# Patient Record
Sex: FEMALE | Race: WHITE | NOT HISPANIC OR LATINO | Employment: UNEMPLOYED | URBAN - NONMETROPOLITAN AREA
[De-identification: names, ages, dates, MRNs, and addresses within clinical notes are randomized per-mention and may not be internally consistent; named-entity substitution may affect disease eponyms.]

---

## 2021-04-30 ENCOUNTER — APPOINTMENT (EMERGENCY)
Dept: RADIOLOGY | Facility: HOSPITAL | Age: 56
DRG: 177 | End: 2021-04-30
Payer: MEDICARE

## 2021-04-30 ENCOUNTER — HOSPITAL ENCOUNTER (INPATIENT)
Facility: HOSPITAL | Age: 56
LOS: 3 days | Discharge: HOME | DRG: 177 | End: 2021-05-03
Attending: EMERGENCY MEDICINE | Admitting: STUDENT IN AN ORGANIZED HEALTH CARE EDUCATION/TRAINING PROGRAM
Payer: MEDICARE

## 2021-04-30 ENCOUNTER — APPOINTMENT (EMERGENCY)
Dept: CT IMAGING | Facility: HOSPITAL | Age: 56
DRG: 177 | End: 2021-04-30
Payer: MEDICARE

## 2021-04-30 DIAGNOSIS — J12.82 PNEUMONIA DUE TO COVID-19 VIRUS: Primary | ICD-10-CM

## 2021-04-30 DIAGNOSIS — E87.6 HYPOKALEMIA: ICD-10-CM

## 2021-04-30 DIAGNOSIS — R79.89 ELEVATED SERUM CREATININE: ICD-10-CM

## 2021-04-30 DIAGNOSIS — R09.02 HYPOXIA: ICD-10-CM

## 2021-04-30 DIAGNOSIS — E87.1 HYPONATREMIA: ICD-10-CM

## 2021-04-30 DIAGNOSIS — U07.1 PNEUMONIA DUE TO COVID-19 VIRUS: Primary | ICD-10-CM

## 2021-04-30 PROBLEM — I10 ESSENTIAL HYPERTENSION: Status: ACTIVE | Noted: 2021-04-30

## 2021-04-30 PROBLEM — J96.01 ACUTE RESPIRATORY FAILURE WITH HYPOXIA (HCC): Status: ACTIVE | Noted: 2021-04-30

## 2021-04-30 PROBLEM — K21.9 GASTROESOPHAGEAL REFLUX DISEASE WITHOUT ESOPHAGITIS: Status: ACTIVE | Noted: 2021-04-30

## 2021-04-30 PROBLEM — J45.909 UNCOMPLICATED ASTHMA: Status: ACTIVE | Noted: 2021-04-30

## 2021-04-30 PROBLEM — N17.9 ACUTE KIDNEY INJURY (HCC): Status: ACTIVE | Noted: 2021-04-30

## 2021-04-30 LAB
ALBUMIN SERPL BCP-MCNC: 3.4 G/DL (ref 3.5–5)
ALP SERPL-CCNC: 91 U/L (ref 46–116)
ALT SERPL W P-5'-P-CCNC: 41 U/L (ref 12–78)
ANION GAP SERPL CALCULATED.3IONS-SCNC: 10 MMOL/L (ref 4–13)
ANISOCYTOSIS BLD QL SMEAR: PRESENT
AST SERPL W P-5'-P-CCNC: 47 U/L (ref 5–45)
BASOPHILS # BLD MANUAL: 0 THOUSAND/UL (ref 0–0.1)
BASOPHILS NFR MAR MANUAL: 0 % (ref 0–1)
BILIRUB SERPL-MCNC: 0.65 MG/DL (ref 0.2–1)
BUN SERPL-MCNC: 41 MG/DL (ref 5–25)
CALCIUM ALBUM COR SERPL-MCNC: 8.9 MG/DL (ref 8.3–10.1)
CALCIUM SERPL-MCNC: 8.4 MG/DL (ref 8.3–10.1)
CHLORIDE SERPL-SCNC: 95 MMOL/L (ref 100–108)
CO2 SERPL-SCNC: 26 MMOL/L (ref 21–32)
CREAT SERPL-MCNC: 1.82 MG/DL (ref 0.6–1.3)
CRP SERPL QL: 54.1 MG/L
D DIMER PPP FEU-MCNC: 1.06 UG/ML FEU
EOSINOPHIL # BLD MANUAL: 0 THOUSAND/UL (ref 0–0.4)
EOSINOPHIL NFR BLD MANUAL: 0 % (ref 0–6)
ERYTHROCYTE [DISTWIDTH] IN BLOOD BY AUTOMATED COUNT: 14.6 % (ref 11.6–15.1)
GFR SERPL CREATININE-BSD FRML MDRD: 30 ML/MIN/1.73SQ M
GLUCOSE SERPL-MCNC: 115 MG/DL (ref 65–140)
HCT VFR BLD AUTO: 44.8 % (ref 34.8–46.1)
HGB BLD-MCNC: 14.4 G/DL (ref 11.5–15.4)
LACTATE SERPL-SCNC: 1.4 MMOL/L (ref 0.5–2)
LG PLATELETS BLD QL SMEAR: PRESENT
LYMPHOCYTES # BLD AUTO: 1.18 THOUSAND/UL (ref 0.6–4.47)
LYMPHOCYTES # BLD AUTO: 21 % (ref 14–44)
MCH RBC QN AUTO: 26.5 PG (ref 26.8–34.3)
MCHC RBC AUTO-ENTMCNC: 32.1 G/DL (ref 31.4–37.4)
MCV RBC AUTO: 82 FL (ref 82–98)
MONOCYTES # BLD AUTO: 0.17 THOUSAND/UL (ref 0–1.22)
MONOCYTES NFR BLD: 3 % (ref 4–12)
NEUTROPHILS # BLD MANUAL: 4.26 THOUSAND/UL (ref 1.85–7.62)
NEUTS SEG NFR BLD AUTO: 76 % (ref 43–75)
NRBC BLD AUTO-RTO: 0 /100 WBCS
NT-PROBNP SERPL-MCNC: 17 PG/ML
PLATELET # BLD AUTO: 173 THOUSANDS/UL (ref 149–390)
PLATELET BLD QL SMEAR: ABNORMAL
PMV BLD AUTO: 11.4 FL (ref 8.9–12.7)
POTASSIUM SERPL-SCNC: 3.2 MMOL/L (ref 3.5–5.3)
PROT SERPL-MCNC: 7.5 G/DL (ref 6.4–8.2)
RBC # BLD AUTO: 5.44 MILLION/UL (ref 3.81–5.12)
RBC MORPH BLD: PRESENT
SARS-COV-2 RNA RESP QL NAA+PROBE: POSITIVE
SODIUM SERPL-SCNC: 131 MMOL/L (ref 136–145)
TOTAL CELLS COUNTED SPEC: 100
TROPONIN I SERPL-MCNC: <0.02 NG/ML
WBC # BLD AUTO: 5.61 THOUSAND/UL (ref 4.31–10.16)

## 2021-04-30 PROCEDURE — 99285 EMERGENCY DEPT VISIT HI MDM: CPT

## 2021-04-30 PROCEDURE — U0005 INFEC AGEN DETEC AMPLI PROBE: HCPCS | Performed by: PHYSICIAN ASSISTANT

## 2021-04-30 PROCEDURE — 85027 COMPLETE CBC AUTOMATED: CPT | Performed by: PHYSICIAN ASSISTANT

## 2021-04-30 PROCEDURE — 94760 N-INVAS EAR/PLS OXIMETRY 1: CPT

## 2021-04-30 PROCEDURE — 80053 COMPREHEN METABOLIC PANEL: CPT | Performed by: PHYSICIAN ASSISTANT

## 2021-04-30 PROCEDURE — 86140 C-REACTIVE PROTEIN: CPT | Performed by: PHYSICIAN ASSISTANT

## 2021-04-30 PROCEDURE — 85379 FIBRIN DEGRADATION QUANT: CPT | Performed by: PHYSICIAN ASSISTANT

## 2021-04-30 PROCEDURE — 87040 BLOOD CULTURE FOR BACTERIA: CPT | Performed by: PHYSICIAN ASSISTANT

## 2021-04-30 PROCEDURE — XW033E5 INTRODUCTION OF REMDESIVIR ANTI-INFECTIVE INTO PERIPHERAL VEIN, PERCUTANEOUS APPROACH, NEW TECHNOLOGY GROUP 5: ICD-10-PCS | Performed by: STUDENT IN AN ORGANIZED HEALTH CARE EDUCATION/TRAINING PROGRAM

## 2021-04-30 PROCEDURE — 71045 X-RAY EXAM CHEST 1 VIEW: CPT

## 2021-04-30 PROCEDURE — 71275 CT ANGIOGRAPHY CHEST: CPT

## 2021-04-30 PROCEDURE — 93005 ELECTROCARDIOGRAM TRACING: CPT

## 2021-04-30 PROCEDURE — 99285 EMERGENCY DEPT VISIT HI MDM: CPT | Performed by: EMERGENCY MEDICINE

## 2021-04-30 PROCEDURE — 96375 TX/PRO/DX INJ NEW DRUG ADDON: CPT

## 2021-04-30 PROCEDURE — 94664 DEMO&/EVAL PT USE INHALER: CPT

## 2021-04-30 PROCEDURE — 85025 COMPLETE CBC W/AUTO DIFF WBC: CPT | Performed by: PHYSICIAN ASSISTANT

## 2021-04-30 PROCEDURE — 83880 ASSAY OF NATRIURETIC PEPTIDE: CPT | Performed by: PHYSICIAN ASSISTANT

## 2021-04-30 PROCEDURE — 83605 ASSAY OF LACTIC ACID: CPT | Performed by: PHYSICIAN ASSISTANT

## 2021-04-30 PROCEDURE — 85007 BL SMEAR W/DIFF WBC COUNT: CPT | Performed by: PHYSICIAN ASSISTANT

## 2021-04-30 PROCEDURE — 96365 THER/PROPH/DIAG IV INF INIT: CPT

## 2021-04-30 PROCEDURE — 99223 1ST HOSP IP/OBS HIGH 75: CPT | Performed by: STUDENT IN AN ORGANIZED HEALTH CARE EDUCATION/TRAINING PROGRAM

## 2021-04-30 PROCEDURE — 36415 COLL VENOUS BLD VENIPUNCTURE: CPT | Performed by: PHYSICIAN ASSISTANT

## 2021-04-30 PROCEDURE — 84484 ASSAY OF TROPONIN QUANT: CPT | Performed by: PHYSICIAN ASSISTANT

## 2021-04-30 PROCEDURE — U0003 INFECTIOUS AGENT DETECTION BY NUCLEIC ACID (DNA OR RNA); SEVERE ACUTE RESPIRATORY SYNDROME CORONAVIRUS 2 (SARS-COV-2) (CORONAVIRUS DISEASE [COVID-19]), AMPLIFIED PROBE TECHNIQUE, MAKING USE OF HIGH THROUGHPUT TECHNOLOGIES AS DESCRIBED BY CMS-2020-01-R: HCPCS | Performed by: PHYSICIAN ASSISTANT

## 2021-04-30 RX ORDER — DEXAMETHASONE SODIUM PHOSPHATE 10 MG/ML
10 INJECTION, SOLUTION INTRAMUSCULAR; INTRAVENOUS ONCE
Status: COMPLETED | OUTPATIENT
Start: 2021-04-30 | End: 2021-04-30

## 2021-04-30 RX ORDER — SODIUM CHLORIDE 9 MG/ML
75 INJECTION, SOLUTION INTRAVENOUS CONTINUOUS
Status: DISCONTINUED | OUTPATIENT
Start: 2021-04-30 | End: 2021-05-03 | Stop reason: HOSPADM

## 2021-04-30 RX ORDER — ASPIRIN 81 MG/1
81 TABLET, CHEWABLE ORAL
COMMUNITY

## 2021-04-30 RX ORDER — BUDESONIDE AND FORMOTEROL FUMARATE DIHYDRATE 160; 4.5 UG/1; UG/1
2 AEROSOL RESPIRATORY (INHALATION) 2 TIMES DAILY
COMMUNITY

## 2021-04-30 RX ORDER — FAMOTIDINE 20 MG/1
20 TABLET, FILM COATED ORAL DAILY
Status: DISCONTINUED | OUTPATIENT
Start: 2021-04-30 | End: 2021-05-02

## 2021-04-30 RX ORDER — NEBIVOLOL 10 MG/1
10 TABLET ORAL
COMMUNITY

## 2021-04-30 RX ORDER — BENZONATATE 100 MG/1
100 CAPSULE ORAL 3 TIMES DAILY PRN
Status: DISCONTINUED | OUTPATIENT
Start: 2021-04-30 | End: 2021-05-01

## 2021-04-30 RX ORDER — OMEPRAZOLE 40 MG/1
40 CAPSULE, DELAYED RELEASE ORAL DAILY
COMMUNITY

## 2021-04-30 RX ORDER — HEPARIN SODIUM 5000 [USP'U]/ML
5000 INJECTION, SOLUTION INTRAVENOUS; SUBCUTANEOUS EVERY 8 HOURS SCHEDULED
Status: DISCONTINUED | OUTPATIENT
Start: 2021-04-30 | End: 2021-05-03 | Stop reason: HOSPADM

## 2021-04-30 RX ORDER — MELATONIN
2000 DAILY
Status: DISCONTINUED | OUTPATIENT
Start: 2021-04-30 | End: 2021-05-03 | Stop reason: HOSPADM

## 2021-04-30 RX ORDER — MULTIVITAMIN/IRON/FOLIC ACID 18MG-0.4MG
1 TABLET ORAL DAILY
Status: DISCONTINUED | OUTPATIENT
Start: 2021-05-07 | End: 2021-05-03 | Stop reason: HOSPADM

## 2021-04-30 RX ORDER — RIZATRIPTAN BENZOATE 10 MG/1
10 TABLET ORAL ONCE AS NEEDED
COMMUNITY

## 2021-04-30 RX ORDER — CEFTRIAXONE 1 G/50ML
1000 INJECTION, SOLUTION INTRAVENOUS EVERY 24 HOURS
Status: DISCONTINUED | OUTPATIENT
Start: 2021-04-30 | End: 2021-05-03 | Stop reason: HOSPADM

## 2021-04-30 RX ORDER — DOXYCYCLINE HYCLATE 100 MG/1
100 CAPSULE ORAL EVERY 12 HOURS
Status: DISCONTINUED | OUTPATIENT
Start: 2021-04-30 | End: 2021-05-03 | Stop reason: HOSPADM

## 2021-04-30 RX ORDER — ASPIRIN 81 MG/1
81 TABLET, CHEWABLE ORAL
Status: DISCONTINUED | OUTPATIENT
Start: 2021-04-30 | End: 2021-05-03 | Stop reason: HOSPADM

## 2021-04-30 RX ORDER — DEXAMETHASONE SODIUM PHOSPHATE 4 MG/ML
6 INJECTION, SOLUTION INTRA-ARTICULAR; INTRALESIONAL; INTRAMUSCULAR; INTRAVENOUS; SOFT TISSUE EVERY 24 HOURS
Status: DISCONTINUED | OUTPATIENT
Start: 2021-04-30 | End: 2021-05-03 | Stop reason: HOSPADM

## 2021-04-30 RX ORDER — ZINC SULFATE 50(220)MG
220 CAPSULE ORAL DAILY
Status: DISCONTINUED | OUTPATIENT
Start: 2021-04-30 | End: 2021-05-03 | Stop reason: HOSPADM

## 2021-04-30 RX ORDER — ALBUTEROL SULFATE 90 UG/1
2 AEROSOL, METERED RESPIRATORY (INHALATION) ONCE
Status: COMPLETED | OUTPATIENT
Start: 2021-04-30 | End: 2021-04-30

## 2021-04-30 RX ORDER — ALBUTEROL SULFATE 90 UG/1
2 AEROSOL, METERED RESPIRATORY (INHALATION) EVERY 6 HOURS PRN
Status: DISCONTINUED | OUTPATIENT
Start: 2021-04-30 | End: 2021-05-03 | Stop reason: HOSPADM

## 2021-04-30 RX ORDER — BUDESONIDE AND FORMOTEROL FUMARATE DIHYDRATE 160; 4.5 UG/1; UG/1
2 AEROSOL RESPIRATORY (INHALATION) 2 TIMES DAILY
Status: DISCONTINUED | OUTPATIENT
Start: 2021-04-30 | End: 2021-05-03 | Stop reason: HOSPADM

## 2021-04-30 RX ORDER — LISINOPRIL 20 MG/1
20 TABLET ORAL
COMMUNITY

## 2021-04-30 RX ORDER — ASCORBIC ACID 500 MG
1000 TABLET ORAL EVERY 12 HOURS SCHEDULED
Status: DISCONTINUED | OUTPATIENT
Start: 2021-04-30 | End: 2021-05-03 | Stop reason: HOSPADM

## 2021-04-30 RX ORDER — CYCLOBENZAPRINE HCL 10 MG
10 TABLET ORAL
COMMUNITY

## 2021-04-30 RX ORDER — POTASSIUM CHLORIDE 20 MEQ/1
40 TABLET, EXTENDED RELEASE ORAL ONCE
Status: COMPLETED | OUTPATIENT
Start: 2021-04-30 | End: 2021-04-30

## 2021-04-30 RX ORDER — SIMVASTATIN 10 MG
10 TABLET ORAL
COMMUNITY

## 2021-04-30 RX ORDER — ALBUTEROL SULFATE 90 UG/1
2 AEROSOL, METERED RESPIRATORY (INHALATION) EVERY 6 HOURS PRN
COMMUNITY

## 2021-04-30 RX ORDER — GUAIFENESIN 600 MG
600 TABLET, EXTENDED RELEASE 12 HR ORAL EVERY 12 HOURS SCHEDULED
Status: DISCONTINUED | OUTPATIENT
Start: 2021-04-30 | End: 2021-05-03 | Stop reason: HOSPADM

## 2021-04-30 RX ORDER — ONDANSETRON 2 MG/ML
4 INJECTION INTRAMUSCULAR; INTRAVENOUS ONCE
Status: COMPLETED | OUTPATIENT
Start: 2021-04-30 | End: 2021-04-30

## 2021-04-30 RX ADMIN — GUAIFENESIN 600 MG: 600 TABLET, EXTENDED RELEASE ORAL at 21:11

## 2021-04-30 RX ADMIN — ONDANSETRON 4 MG: 2 INJECTION INTRAMUSCULAR; INTRAVENOUS at 11:32

## 2021-04-30 RX ADMIN — Medication 2000 UNITS: at 16:05

## 2021-04-30 RX ADMIN — BUDESONIDE AND FORMOTEROL FUMARATE DIHYDRATE 2 PUFF: 160; 4.5 AEROSOL RESPIRATORY (INHALATION) at 17:22

## 2021-04-30 RX ADMIN — ASPIRIN 81 MG CHEWABLE TABLET 81 MG: 81 TABLET CHEWABLE at 21:11

## 2021-04-30 RX ADMIN — HEPARIN SODIUM 5000 UNITS: 5000 INJECTION INTRAVENOUS; SUBCUTANEOUS at 17:20

## 2021-04-30 RX ADMIN — POTASSIUM CHLORIDE 40 MEQ: 1500 TABLET, EXTENDED RELEASE ORAL at 17:23

## 2021-04-30 RX ADMIN — DOXYCYCLINE 100 MG: 100 CAPSULE ORAL at 16:05

## 2021-04-30 RX ADMIN — SODIUM CHLORIDE 100 ML/HR: 0.9 INJECTION, SOLUTION INTRAVENOUS at 16:04

## 2021-04-30 RX ADMIN — HEPARIN SODIUM 5000 UNITS: 5000 INJECTION INTRAVENOUS; SUBCUTANEOUS at 21:11

## 2021-04-30 RX ADMIN — ALBUTEROL SULFATE 2 PUFF: 90 AEROSOL, METERED RESPIRATORY (INHALATION) at 12:47

## 2021-04-30 RX ADMIN — FAMOTIDINE 20 MG: 20 TABLET ORAL at 16:05

## 2021-04-30 RX ADMIN — DEXAMETHASONE SODIUM PHOSPHATE 6 MG: 4 INJECTION INTRA-ARTICULAR; INTRALESIONAL; INTRAMUSCULAR; INTRAVENOUS; SOFT TISSUE at 16:05

## 2021-04-30 RX ADMIN — BENZONATATE 100 MG: 100 CAPSULE ORAL at 16:05

## 2021-04-30 RX ADMIN — REMDESIVIR 200 MG: 100 INJECTION, POWDER, LYOPHILIZED, FOR SOLUTION INTRAVENOUS at 17:54

## 2021-04-30 RX ADMIN — CEFTRIAXONE 1000 MG: 1 INJECTION, SOLUTION INTRAVENOUS at 16:04

## 2021-04-30 RX ADMIN — SODIUM CHLORIDE, SODIUM LACTATE, POTASSIUM CHLORIDE, AND CALCIUM CHLORIDE 1000 ML: .6; .31; .03; .02 INJECTION, SOLUTION INTRAVENOUS at 11:32

## 2021-04-30 RX ADMIN — IOHEXOL 85 ML: 350 INJECTION, SOLUTION INTRAVENOUS at 12:33

## 2021-04-30 RX ADMIN — OXYCODONE HYDROCHLORIDE AND ACETAMINOPHEN 1000 MG: 500 TABLET ORAL at 21:11

## 2021-04-30 RX ADMIN — ZINC SULFATE 220 MG (50 MG) CAPSULE 220 MG: CAPSULE at 16:05

## 2021-04-30 RX ADMIN — SODIUM CHLORIDE 1000 ML: 0.9 INJECTION, SOLUTION INTRAVENOUS at 13:47

## 2021-04-30 RX ADMIN — DEXAMETHASONE SODIUM PHOSPHATE 10 MG: 10 INJECTION, SOLUTION INTRAMUSCULAR; INTRAVENOUS at 11:32

## 2021-04-30 NOTE — ASSESSMENT & PLAN NOTE
Patient on hydrochlorothiazide and lisinopril at home however will hold these in light of hypotension

## 2021-04-30 NOTE — ASSESSMENT & PLAN NOTE
Possibly secondary to dehydration and hypotension  No known CKD at baseline  Will gently hydrate with IV fluids  Continue to monitor creatinine  Avoid nephrotoxins  Monitor I's and O's  Avoid hypotensive episodes

## 2021-04-30 NOTE — RESPIRATORY THERAPY NOTE
RT Protocol Note  Melany River 60 y o  female MRN: [id number]  Unit/Bed#: -01 Encounter: [id number]    Assessment    Principal Problem:    Pneumonia due to COVID-19 virus  Active Problems:    Acute respiratory failure with hypoxia (HCC)    Uncomplicated asthma    Gastroesophageal reflux disease without esophagitis    Essential hypertension    Acute kidney injury ([place] [address] )      Home Pulmonary Medications:         Past Medical History:   Diagnosis Date    Asthma     CHF (congestive heart failure) ([place] [address] )     Diabetes mellitus ([place] [address] )     Fibromyalgia     Migraine      Social History     Socioeconomic History    Marital status: /Civil Union     Spouse name: None    Number of children: None    Years of education: None    Highest education level: None   Occupational History    None   Social Needs    Financial resource strain: None    Food insecurity     Worry: None     Inability: None    Transportation needs     Medical: None     Non-medical: None   Tobacco Use    Smoking status: Never Smoker    Smokeless tobacco: Never Used   Substance and Sexual Activity    Alcohol use: Yes     Frequency: Monthly or less     Drinks per session: 1 or 2     Binge frequency: Never    Drug use: Never    Sexual activity: None   Lifestyle    Physical activity     Days per week: None     Minutes per session: None    Stress: None   Relationships    Social connections     Talks on phone: None     Gets together: None     Attends [employer] service: None     Active member of club or organization: None     Attends meetings of clubs or organizations: None     Relationship status: None    Intimate partner violence     Fear of current or ex partner: None     Emotionally abused: None     Physically abused: None     Forced sexual activity: None   Other Topics Concern    None   Social History Narrative    None       Subjective         Objective    Physical Exam:   Assessment Type: (P) Assess only  General Appearance: (P) Alert, Awake  Respiratory Pattern: (P) Labored  Chest Assessment: (P) Chest expansion symmetrical  Bilateral Breath Sounds: (P) Diminished, Coarse  O2 Device: (P) 2L    Vitals:  Blood pressure 124/72, pulse 78, temperature 99 °F (37 2 °C), temperature source Oral, resp  rate 18, height 5' 6" (1 676 m), weight 116 kg (256 lb 9 9 oz), SpO2 100 %  Imaging and other studies: I have personally reviewed pertinent reports  O2 Device: (P) 2L     Plan    Respiratory Plan: (P) Home Bronchodilator Patient pathway        Resp Comments: (P) Pt admitted w/pneumonia due to covid + diagnosis  Pt is on 2L nasal canal with diminished and coarse BS bilaterally  Xray shows ground glass opacities  Pt w  HO asthma and take symbicort bid and albuterol prn  IS being performed by nursing

## 2021-04-30 NOTE — H&P
[address]  H&P- Blanquita Whittington 1965, 60 y o  female MRN: [id number]  Unit/Bed#: -01 Encounter: [id number]  Primary Care Provider: Alverto Marrero DO   Date and time admitted to hospital: 4/30/2021 11:05 AM    * Pneumonia due to COVID-19 virus  Assessment & Plan  Patient presenting with 2 and half weeks of fatigue and some cough  Worsening shortness of breath prompted her to come to the ED  Patient currently needing 2 L of oxygen via nasal cannula  CT scan showing bilateral ground-glass opacities highly suspicious for COVID pneumonia, COVID positive  Will admit patient under mild pathway  Follow-up inflammatory markers  Follow-up procalcitonin, if negative x2 will discontinue antibiotics  Respiratory protocol  Vitamin-C, zinc, vitamin-D, remdesivir (4/30-_)  Dexamethasone  Oxygen via nasal cannula currently on 2 L  CTA negative for PE  Not a candidate for convalescent plasma as symptoms have been ongoing for more than 5 days    Acute kidney injury ([address] )  Assessment & Plan  Possibly secondary to dehydration and hypotension  No known CKD at baseline  Will gently hydrate with IV fluids  Continue to monitor creatinine  Avoid nephrotoxins  Monitor I's and O's  Avoid hypotensive episodes    Essential hypertension  Assessment & Plan  Patient on hydrochlorothiazide and lisinopril at home however will hold these in light of hypotension    Gastroesophageal reflux disease without esophagitis  Assessment & Plan  Will start patient on famotidine at this time as part of mild COVID pathway  On omeprazole at home    Uncomplicated asthma  Assessment & Plan  Currently stable, no wheezing at this time, continue Symbicort and albuterol    Acute respiratory failure with hypoxia ([address] )  Assessment & Plan  Please see plan under COVID pneumonia      VTE Prophylaxis: Heparin  / sequential compression device   Code Status: FULL CODE  POLST: There is no POLST form on file for this patient (pre-hospital)  Discussion with family:  Patient    Anticipated Length of Stay:  Patient will be admitted on an Inpatient basis with an anticipated length of stay of  more than 2 midnights  Justification for Hospital Stay:  Acute hypoxic respiratory failure    Total Time for Visit, including Counseling / Coordination of Care: 45 minutes  Greater than 50% of this total time spent on direct patient counseling and coordination of care  Chief Complaint:   Worsening shortness of breath    History of Present Illness:    Linnette Parmar is a 60 y o  female who presents with worsening shortness of breath that started over the last 3 days  Patient states that she started having weakness, mild cough, fatigue 2 and half weeks ago  She and her  started exhibiting symptoms at about the same time after having been exposed to her sister and her sister's  who had mild cough and colds 3 weeks ago  She was feeling fine up to few days ago when she started feeling shortness of breath and cough  She endorses associated decreased appetite, some diarrhea, no abdominal pain  Increasing shortness of breath prompted her to seek consult in the emergency room  Review of Systems:    Review of Systems   Constitutional: Positive for activity change, appetite change, fatigue and fever  Negative for chills  HENT: Negative for ear pain and sore throat  Eyes: Negative for pain and visual disturbance  Respiratory: Positive for cough and shortness of breath  Cardiovascular: Negative for chest pain and palpitations  Gastrointestinal: Negative for abdominal pain and vomiting  Genitourinary: Negative for dysuria and hematuria  Musculoskeletal: Negative for arthralgias and back pain  Skin: Negative for color change and rash  Neurological: Negative for seizures and syncope  All other systems reviewed and are negative           Past Medical and Surgical History:     Past Medical History:   Diagnosis Date    Asthma  [facility]) [facility])     Diabetes mellitus ([facility] [address] )     Fibromyalgia     Migraine        Past Surgical History:   Procedure Laterality Date    BACK SURGERY      HYSTERECTOMY      NECK SURGERY         Meds/Allergies:    Prior to Admission medications    Medication Sig Start Date End Date Taking? Authorizing Provider   albuterol (PROVENTIL HFA,VENTOLIN HFA) 90 mcg/act inhaler Inhale 2 puffs every 6 (six) hours as needed for wheezing   Yes Historical Provider, MD   aspirin 81 mg chewable tablet Chew 81 mg daily at bedtime    Yes Historical Provider, MD   budesonide-formoterol (SYMBICORT) 160-4 5 mcg/act inhaler Inhale 2 puffs 2 (two) times a day Rinse mouth after use  Yes Historical Provider, MD   cyclobenzaprine (FLEXERIL) 10 mg tablet Take 10 mg by mouth daily at bedtime    Yes Historical Provider, MD   HYDROCHLOROTHIAZIDE PO Take 37 5 mg by mouth 2 (two) times a day    Yes Historical Provider, MD   lisinopril (ZESTRIL) 20 mg tablet Take 20 mg by mouth daily at bedtime    Yes Historical Provider, MD   nebivolol (BYSTOLIC) 10 mg tablet Take 10 mg by mouth daily at bedtime    Yes Historical Provider, MD   omeprazole (PriLOSEC) 40 MG capsule Take 40 mg by mouth daily   Yes Historical Provider, MD   rizatriptan (MAXALT) 10 MG tablet Take 10 mg by mouth once as needed for migraine May repeat in 2 hours if needed   Yes Historical Provider, MD   simvastatin (ZOCOR) 10 mg tablet Take 10 mg by mouth daily at bedtime   Yes Historical Provider, MD     I have reviewed home medications with patient personally  Allergies:    Allergies   Allergen Reactions    Hctz [Hydrochlorothiazide] Other (See Comments)     Only with [employer] brand causes fluid build up        Social History:     Marital Status: /Civil Union   Patient Pre-hospital Living Situation:  Lives at home with her   Patient Pre-hospital Level of Mobility:  ambulates independently  Patient Pre-hospital Diet Restrictions: None  Substance Use History:   Social History     Substance and Sexual Activity   Alcohol Use Yes    Frequency: Monthly or less    Drinks per session: 1 or 2    Binge frequency: Never     Social History     Tobacco Use   Smoking Status Never Smoker   Smokeless Tobacco Never Used     Social History     Substance and Sexual Activity   Drug Use Never       Family History:    Heart disease - father    Physical Exam:     Vitals:   Blood Pressure: 124/72 (04/30/21 1521)  Pulse: 78 (04/30/21 1521)  Temperature: 99 °F (37 2 °C) (04/30/21 1521)  Temp Source: Oral (04/30/21 1521)  Respirations: 18 (04/30/21 1521)  Height: 5' 6" (167 6 cm) (04/30/21 1543)  Weight - Scale: 116 kg (256 lb 9 9 oz) (04/30/21 1543)  SpO2: 100 % (04/30/21 1521)    Physical Exam  Vitals signs and nursing note reviewed  Constitutional:       General: She is not in acute distress  Appearance: She is well-developed  She is obese  She is ill-appearing  HENT:      Head: Normocephalic and atraumatic  Eyes:      Conjunctiva/sclera: Conjunctivae normal    Neck:      Musculoskeletal: Neck supple  Cardiovascular:      Rate and Rhythm: Normal rate and regular rhythm  Heart sounds: No murmur  Pulmonary:      Effort: Pulmonary effort is normal  No respiratory distress  Comments: Coarse breath sounds bilaterally  Abdominal:      Palpations: Abdomen is soft  Tenderness: There is no abdominal tenderness  Skin:     General: Skin is warm and dry  Neurological:      Mental Status: She is alert and oriented to person, place, and time  Additional Data:     Lab Results: I have personally reviewed pertinent reports        Results from last 7 days   Lab Units 04/30/21  1125   WBC Thousand/uL 5 61   HEMOGLOBIN g/dL 14 4   HEMATOCRIT % 44 8   PLATELETS Thousands/uL 173   LYMPHO PCT % 21   MONO PCT % 3*   EOS PCT % 0     Results from last 7 days   Lab Units 04/30/21  1125   SODIUM mmol/L 131*   POTASSIUM mmol/L 3 2*   CHLORIDE mmol/L 95*   CO2 mmol/L 26   BUN mg/dL 41*   CREATININE mg/dL 1 82*   ANION GAP mmol/L 10   CALCIUM mg/dL 8 4   ALBUMIN g/dL 3 4*   TOTAL BILIRUBIN mg/dL 0 65   ALK PHOS U/L 91   ALT U/L 41   AST U/L 47*   GLUCOSE RANDOM mg/dL 115                 Results from last 7 days   Lab Units 04/30/21  1125   LACTIC ACID mmol/L 1 4       Imaging: I have personally reviewed pertinent reports  CTA ED chest PE Study   Final Result by Vu Garcia MD (04/30 1331)      1  No evidence of pulmonary embolism   2  Multifocal bilateral groundglass opacities in the lungs  In the setting of COVID-19 infection, findings compatible with viral pneumonia   3  Mild mediastinal and hilar adenopathy, likely reactive   4  Bilateral nephrolithiasis                     Workstation performed: [id number]         XR chest 1 view portable   Final Result by Thomas Cornelius MD (04/30 8253)      Patchy opacity left lung base suspicious for possible Covid related infiltrate                  Workstation performed: [id number]           ·     Allscripts / Epic Records Reviewed: Yes     ** Please Note: This note has been constructed using a voice recognition system   **

## 2021-04-30 NOTE — ASSESSMENT & PLAN NOTE
Patient presenting with 2 and half weeks of fatigue and some cough  Worsening shortness of breath prompted her to come to the ED  Patient currently needing 2 L of oxygen via nasal cannula  CT scan showing bilateral ground-glass opacities highly suspicious for COVID pneumonia, COVID positive  Will admit patient under mild pathway  Follow-up inflammatory markers  Follow-up procalcitonin, if negative x2 will discontinue antibiotics  Respiratory protocol  Vitamin-C, zinc, vitamin-D, remdesivir (4/30-_)  Dexamethasone  Oxygen via nasal cannula currently on 2 L  CTA negative for PE  Not a candidate for convalescent plasma as symptoms have been ongoing for more than 5 days

## 2021-04-30 NOTE — ED PROVIDER NOTES
History  Chief Complaint   Patient presents with    Cough     pt has had cough for 2 5 weeks now  C/O fatigue and cough  Spouse tested + Covid yesterday  49-year-old female presents emergency department for evaluation of cough, shortness of breath  Past medical history asthma, CHF, diabetes, fibromyalgia  She reports she has had cough for the past 2 5 weeks  Cough is nonproductive  States she has been using Symbicort at without improvement of symptoms  Reports symptoms have been worsening  Patient states over last several days she had increased fatigue, generalized body aches  She reports  was admitted to the hospital yesterday for [place]  Patient reports chest pain only with coughing  Reports chest pain is generalized throughout the chest without radiation  She reports shortness of breath is worse with moving, talking, coughing  Patient denies any fevers or chills  She reports decreased appetite  Reports nausea, does not denies vomiting, diarrhea, constipation  Patient denies history of DVT or PE  She denies any hemoptysis  History provided by:  Patient  Shortness of Breath  Severity:  Moderate  Onset quality:  Gradual  Progression:  Worsening  Chronicity:  New  Relieved by:  Nothing  Ineffective treatments: Symbicort  Associated symptoms: chest pain, cough and headaches    Associated symptoms: no abdominal pain, no claudication, no diaphoresis, no ear pain, no fever, no hemoptysis, no neck pain, no PND, no rash, no sore throat, no sputum production, no syncope, no swollen glands, no vomiting and no wheezing        Prior to Admission Medications   Prescriptions Last Dose Informant Patient Reported? Taking?    HYDROCHLOROTHIAZIDE PO 4/29/2021 at Unknown time  Yes Yes   Sig: Take 37 5 mg by mouth 2 (two) times a day    albuterol (PROVENTIL HFA,VENTOLIN HFA) 90 mcg/act inhaler   Yes Yes   Sig: Inhale 2 puffs every 6 (six) hours as needed for wheezing   aspirin 81 mg chewable tablet 4/29/2021 at Unknown time  Yes Yes   Sig: Chew 81 mg daily at bedtime    budesonide-formoterol (SYMBICORT) 160-4 5 mcg/act inhaler 4/30/2021 at Unknown time  Yes Yes   Sig: Inhale 2 puffs 2 (two) times a day Rinse mouth after use  cyclobenzaprine (FLEXERIL) 10 mg tablet 4/29/2021 at Unknown time  Yes Yes   Sig: Take 10 mg by mouth daily at bedtime    lisinopril (ZESTRIL) 20 mg tablet 4/29/2021 at Unknown time  Yes Yes   Sig: Take 20 mg by mouth daily at bedtime    nebivolol (BYSTOLIC) 10 mg tablet 2/78/3149 at Unknown time  Yes Yes   Sig: Take 10 mg by mouth daily at bedtime    omeprazole (PriLOSEC) 40 MG capsule 4/30/2021 at Unknown time  Yes Yes   Sig: Take 40 mg by mouth daily   rizatriptan (MAXALT) 10 MG tablet Past Month at Unknown time  Yes Yes   Sig: Take 10 mg by mouth once as needed for migraine May repeat in 2 hours if needed   simvastatin (ZOCOR) 10 mg tablet 4/29/2021 at Unknown time  Yes Yes   Sig: Take 10 mg by mouth daily at bedtime      Facility-Administered Medications: None       Past Medical History:   Diagnosis Date    Asthma     [facility]     Diabetes mellitus ([facility] )     Fibromyalgia     Migraine        Past Surgical History:   Procedure Laterality Date    BACK SURGERY      HYSTERECTOMY      NECK SURGERY         History reviewed  No pertinent family history  I have reviewed and agree with the history as documented  E-Cigarette/Vaping    E-Cigarette Use Never User      E-Cigarette/Vaping Substances     Social History     Tobacco Use    Smoking status: Never Smoker    Smokeless tobacco: Never Used   Substance Use Topics    Alcohol use: Yes     Frequency: Monthly or less     Drinks per session: 1 or 2     Binge frequency: Never    Drug use: Never       Review of Systems   Constitutional: Positive for appetite change and fatigue  Negative for chills, diaphoresis and fever  HENT: Negative  Negative for ear pain and sore throat      Eyes: Negative for visual disturbance  Respiratory: Positive for cough and shortness of breath  Negative for hemoptysis, sputum production, choking, chest tightness, wheezing and stridor  Cardiovascular: Positive for chest pain  Negative for palpitations, claudication, leg swelling, syncope and PND  Gastrointestinal: Positive for nausea  Negative for abdominal distention, abdominal pain, constipation, diarrhea and vomiting  Genitourinary: Negative  Musculoskeletal: Positive for myalgias  Negative for neck pain  Skin: Negative for color change, pallor, rash and wound  Neurological: Positive for headaches  Negative for tremors, seizures, syncope, facial asymmetry, speech difficulty, weakness, light-headedness and numbness  All other systems reviewed and are negative  Physical Exam  Physical Exam  Vitals signs and nursing note reviewed  Constitutional:       General: She is not in acute distress  Appearance: Normal appearance  She is obese  She is not ill-appearing, toxic-appearing or diaphoretic  HENT:      Head: Normocephalic and atraumatic  Nose: Nose normal  No congestion or rhinorrhea  Mouth/Throat:      Mouth: Mucous membranes are moist       Pharynx: Oropharynx is clear  No oropharyngeal exudate or posterior oropharyngeal erythema  Eyes:      Extraocular Movements: Extraocular movements intact  Conjunctiva/sclera: Conjunctivae normal       Pupils: Pupils are equal, round, and reactive to light  Neck:      Musculoskeletal: Normal range of motion and neck supple  No muscular tenderness  Cardiovascular:      Rate and Rhythm: Normal rate and regular rhythm  Pulses:           Radial pulses are 2+ on the right side and 2+ on the left side  Dorsalis pedis pulses are 2+ on the right side and 2+ on the left side  Posterior tibial pulses are 2+ on the right side and 2+ on the left side  Pulmonary:      Effort: Tachypnea present        Breath sounds: Decreased breath sounds, wheezing and rhonchi present  No rales  Abdominal:      General: Abdomen is flat  Bowel sounds are normal  There is no distension  Palpations: Abdomen is soft  Tenderness: There is no abdominal tenderness  There is no guarding  Musculoskeletal: Normal range of motion  General: No swelling or tenderness  Skin:     General: Skin is warm and dry  Capillary Refill: Capillary refill takes less than 2 seconds  Coloration: Skin is not pale  Findings: No bruising, erythema or rash  Neurological:      General: No focal deficit present  Mental Status: She is alert and oriented to person, place, and time  Sensory: No sensory deficit  Motor: No weakness  Coordination: Coordination normal       Deep Tendon Reflexes: Reflexes normal    Psychiatric:         Mood and Affect: Mood normal          Behavior: Behavior normal          Thought Content:  Thought content normal          Judgment: Judgment normal          Vital Signs  ED Triage Vitals   Temperature Pulse Respirations Blood Pressure SpO2   04/30/21 1107 04/30/21 1107 04/30/21 1107 04/30/21 1113 04/30/21 1107   97 6 °F (36 4 °C) 86 (!) 28 (!) 86/52 (!) 88 %      Temp Source Heart Rate Source Patient Position - Orthostatic VS BP Location FiO2 (%)   04/30/21 1107 04/30/21 1107 04/30/21 1521 04/30/21 1521 --   Temporal Monitor Lying Right arm       Pain Score       04/30/21 1107       8           Vitals:    04/30/21 1415 04/30/21 1430 04/30/21 1521 04/30/21 1656   BP:  94/55 124/72    Pulse: 75 79 78 78   Patient Position - Orthostatic VS:   Lying          Visual Acuity      ED Medications  Medications   ascorbic acid (VITAMIN C) tablet 1,000 mg (has no administration in time range)   zinc sulfate (ZINCATE) capsule 220 mg (220 mg Oral Given 4/30/21 1605)     Followed by   multivitamin-minerals (CENTRUM ADULTS) tablet 1 tablet (has no administration in time range)   cholecalciferol (VITAMIN D3) tablet 2,000 Units (2,000 Units Oral Given 4/30/21 1605)   dexamethasone (DECADRON) injection 6 mg (6 mg Intravenous Given 4/30/21 1605)   famotidine (PEPCID) tablet 20 mg (20 mg Oral Given 4/30/21 1605)   remdesivir (Veklury) 200 mg in sodium chloride 0 9 % 250 mL IVPB (has no administration in time range)     Followed by   remdesivir Crista Bounds) 100 mg in sodium chloride 0 9 % 250 mL IVPB (has no administration in time range)   cefTRIAXone (ROCEPHIN) IVPB (premix in dextrose) 1,000 mg 50 mL (1,000 mg Intravenous New Bag 4/30/21 1604)   doxycycline hyclate (VIBRAMYCIN) capsule 100 mg (100 mg Oral Given 4/30/21 1605)   sodium chloride 0 9 % infusion (100 mL/hr Intravenous New Bag 4/30/21 1604)   aspirin chewable tablet 81 mg (has no administration in time range)   budesonide-formoterol (SYMBICORT) 160-4 5 mcg/act inhaler 2 puff (has no administration in time range)   benzonatate (TESSALON PERLES) capsule 100 mg (100 mg Oral Given 4/30/21 1605)   guaiFENesin (MUCINEX) 12 hr tablet 600 mg (has no administration in time range)   albuterol (PROVENTIL HFA,VENTOLIN HFA) inhaler 2 puff (has no administration in time range)   potassium chloride (K-DUR,KLOR-CON) CR tablet 40 mEq (has no administration in time range)   heparin (porcine) subcutaneous injection 5,000 Units (has no administration in time range)   lactated ringers bolus 1,000 mL (0 mL Intravenous Stopped 4/30/21 1239)   dexamethasone (PF) (DECADRON) injection 10 mg (10 mg Intravenous Given 4/30/21 1132)   ondansetron (ZOFRAN) injection 4 mg (4 mg Intravenous Given 4/30/21 1132)   albuterol (PROVENTIL HFA,VENTOLIN HFA) inhaler 2 puff (2 puffs Inhalation Given 4/30/21 1247)   iohexol (OMNIPAQUE) 350 MG/ML injection (SINGLE-DOSE) 85 mL (85 mL Intravenous Given 4/30/21 1233)   sodium chloride 0 9 % bolus 1,000 mL (0 mL Intravenous Stopped 4/30/21 1503)       Diagnostic Studies  Results Reviewed     Procedure Component Value Units Date/Time    Manual Differential(PHLEBS Do Not Order) [[id number]]  (Abnormal) Collected: 04/30/21 1125    Lab Status: Final result Specimen: Blood from Arm, Left Updated: 04/30/21 1231     Segmented % 76 %      Lymphocytes % 21 %      Monocytes % 3 %      Eosinophils, % 0 %      Basophils % 0 %      Absolute Neutrophils 4 26 Thousand/uL      Lymphocytes Absolute 1 18 Thousand/uL      Monocytes Absolute 0 17 Thousand/uL      Eosinophils Absolute 0 00 Thousand/uL      Basophils Absolute 0 00 Thousand/uL      Total Counted 100     RBC Morphology Present     Anisocytosis Present     Platelet Estimate Borderline     Large Platelet Present    Novel Coronavirus (Covid-19),PCR [facility] - 2 Hour Stat [[id number]]  (Abnormal) Collected: 04/30/21 1125    Lab Status: Final result Specimen: Nares from Nasopharyngeal Swab Updated: 04/30/21 1221     SARS-CoV-2 Positive    Narrative: The specimen collection materials, transport medium, and/or testing methodology utilized in the production of these test results have been proven to be reliable in a limited validation with an abbreviated program under the Emergency Utilization Authorization provided by the FDA  Testing reported as "Presumptive positive" will be confirmed with secondary testing to ensure result accuracy  Clinical caution and judgement should be used with the interpretation of these results with consideration of the clinical impression and other laboratory testing  Testing reported as "Positive" or "Negative" has been proven to be accurate according to standard laboratory validation requirements  All testing is performed with control materials showing appropriate reactivity at standard intervals        NT-BNP PRO [[id number]]  (Normal) Collected: 04/30/21 1125    Lab Status: Final result Specimen: Blood from Arm, Left Updated: 04/30/21 1203     NT-proBNP 17 pg/mL     C-reactive protein [[id number]]  (Abnormal) Collected: 04/30/21 1125    Lab Status: Final result Specimen: Blood from Arm, Left Updated: 04/30/21 1203 CRP 54 1 mg/L     Troponin I [[id number]]  (Normal) Collected: 04/30/21 1125    Lab Status: Final result Specimen: Blood from Arm, Left Updated: 04/30/21 1158     Troponin I <0 02 ng/mL     Comprehensive metabolic panel [[id number]]  (Abnormal) Collected: 04/30/21 1125    Lab Status: Final result Specimen: Blood from Arm, Left Updated: 04/30/21 1156     Sodium 131 mmol/L      Potassium 3 2 mmol/L      Chloride 95 mmol/L      CO2 26 mmol/L      ANION GAP 10 mmol/L      BUN 41 mg/dL      Creatinine 1 82 mg/dL      Glucose 115 mg/dL      Calcium 8 4 mg/dL      Corrected Calcium 8 9 mg/dL      AST 47 U/L      ALT 41 U/L      Alkaline Phosphatase 91 U/L      Total Protein 7 5 g/dL      Albumin 3 4 g/dL      Total Bilirubin 0 65 mg/dL      eGFR 30 ml/min/1 73sq m     Narrative:      [place] guidelines for Chronic Kidney Disease (CKD):     Stage 1 with normal or high GFR (GFR > 90 mL/min/1 73 square meters)    Stage 2 Mild CKD (GFR = 60-89 mL/min/1 73 square meters)    Stage 3A Moderate CKD (GFR = 45-59 mL/min/1 73 square meters)    Stage 3B Moderate CKD (GFR = 30-44 mL/min/1 73 square meters)    Stage 4 Severe CKD (GFR = 15-29 mL/min/1 73 square meters)    Stage 5 End Stage CKD (GFR <15 mL/min/1 73 square meters)  Note: GFR calculation is accurate only with a steady state creatinine    Lactic acid [[id number]]  (Normal) Collected: 04/30/21 1125    Lab Status: Final result Specimen: Blood from Arm, Left Updated: 04/30/21 1155     LACTIC ACID 1 4 mmol/L     Narrative:      Result may be elevated if tourniquet was used during collection  Blood culture #1 [[id number]] Collected: 04/30/21 1151    Lab Status: In process Specimen: Blood from Arm, Left Updated: 04/30/21 1154    Blood culture #2 [[id number]] Collected: 04/30/21 1151    Lab Status:  In process Specimen: Blood from Arm, Right Updated: 04/30/21 1154    D-Dimer [[id number]]  (Abnormal) Collected: 04/30/21 1125    Lab Status: Final result Specimen: Blood from Arm, Left Updated: 04/30/21 1153     D-Dimer, Quant 1 06 ug/ml FEU     CBC and differential [[id number]]  (Abnormal) Collected: 04/30/21 1125    Lab Status: Final result Specimen: Blood from Arm, Left Updated: 04/30/21 1141     WBC 5 61 Thousand/uL      RBC 5 44 Million/uL      Hemoglobin 14 4 g/dL      Hematocrit 44 8 %      MCV 82 fL      MCH 26 5 pg      MCHC 32 1 g/dL      RDW 14 6 %      MPV 11 4 fL      Platelets 410 Thousands/uL      nRBC 0 /100 WBCs     Narrative: This is an appended report  These results have been appended to a previously verified report  CTA ED chest PE Study   Final Result by Rajan Pina MD (04/30 1331)      1  No evidence of pulmonary embolism   2  Multifocal bilateral groundglass opacities in the lungs  In the setting of COVID-19 infection, findings compatible with viral pneumonia   3  Mild mediastinal and hilar adenopathy, likely reactive   4    Bilateral nephrolithiasis                     Workstation performed: [id number]         XR chest 1 view portable   Final Result by Maryellen Byrnes MD (04/30 1901)      Patchy opacity left lung base suspicious for possible Covid related infiltrate                  Workstation performed: [id number]                    Procedures  ECG 12 Lead Documentation Only    Date/Time: 4/30/2021 11:12 AM  Performed by: Frank Winslow PA-C  Authorized by: Frank Winslow PA-C     Indications / Diagnosis:  SOB  ECG reviewed by me, the ED Provider: yes    Patient location:  ED  Interpretation:     Interpretation: non-specific    Rate:     ECG rate:  84    ECG rate assessment: normal    Rhythm:     Rhythm: sinus rhythm    Ectopy:     Ectopy: none    QRS:     QRS axis:  Left    QRS intervals:  Normal  Conduction:     Conduction: normal    ST segments:     ST segments:  Normal  T waves:     T waves: normal               ED Course  ED Course as of Apr 30 1715 Fri Apr 30, 2021   1156 Plan to get CTA to rule out PE D-Dimer, Quant(!): 1 06   1157 Patient found hypokalemia and hyponatremia  Receiving IV fluids and will replete potassium orally       1158 Noted elevated creatinine  No previous for comparison   Creatinine(!): 1 82   1208 Troponin normal      1211 Patient's GFR 30, discussed with radiology who approved CT a with hydration before and after scan        1221 SARS-COV-2(!): Positive   1401 CTA:   No evidence of pulmonary embolism  2  Multifocal bilateral groundglass opacities in the lungs  In the setting of COVID-19 infection, findings compatible with viral pneumonia  3  Mild mediastinal and hilar adenopathy, likely reactive  4  Bilateral nephrolithiasis      1401 Discussed with patient  Patient feeling improved  [place] text sent to hospitalist requesting admission      1406 Patient accepted by medicine service                 HEART Risk Score      Most Recent Value   Heart Score Risk Calculator   History  0 Filed at: 04/30/2021 1216   ECG  0 Filed at: 04/30/2021 1216   Age  1 Filed at: 04/30/2021 1216   Risk Factors  1 Filed at: 04/30/2021 1216   Troponin  0 Filed at: 04/30/2021 1216   HEART Score  2 Filed at: 04/30/2021 1216                      SBIRT 20yo+      Most Recent Value   SBIRT (25 yo +)   In order to provide better care to our patients, we are screening all of our patients for alcohol and drug use  Would it be okay to ask you these screening questions? No Filed at: 04/30/2021 1121                    MDM  Number of Diagnoses or Management Options  Elevated serum creatinine: new and requires workup  Hypokalemia: new and requires workup  Hyponatremia: new and requires workup  Hypoxia: new and requires workup  Pneumonia due to COVID-19 virus: new and requires workup  Diagnosis management comments: 60year old female presents to the emergency department for evaluation of shortness of breath  Vitals and medical record reviewed  Patient hypoxic on room air  Started on 2 L nasal cannula    Physical exam significant for decreased breath sounds, noted rales and rhonchi  Patient was mild tachypneic  Heart regular rhythm  EKG was nonischemic  Troponin negative  COVID positive  Patient had elevated D-dimer  CT PE negative for PE  Noted for COVID pneumonia  Noted hypokalemia and hyponatremia  Additionally patient was noted to have elevated creatinine however no previous for comparison  Patient had improvement of symptoms with fluids, steroid, Zofran  Discussed findings with patient and hospitalist both agree with admission  Patient was accepted the medicine service         Amount and/or Complexity of Data Reviewed  Clinical lab tests: ordered and reviewed  Tests in the radiology section of CPT®: ordered and reviewed  Review and summarize past medical records: yes  Discuss the patient with other providers: yes  Independent visualization of images, tracings, or specimens: yes        Disposition  Final diagnoses:   Pneumonia due to COVID-19 virus   Hypoxia   Hypokalemia   Hyponatremia   Elevated serum creatinine     Time reflects when diagnosis was documented in both MDM as applicable and the Disposition within this note     Time User Action Codes Description Comment    4/30/2021  2:01 PM Elvin Bevels Add [U07 1,  J12 82] Pneumonia due to COVID-19 virus     4/30/2021  2:01 PM Elvin Bevels Add [R09 02] Hypoxia     4/30/2021  5:14 PM Elvin Bevels Add [E87 6] Hypokalemia     4/30/2021  5:14 PM Elvin Bevels Add [E87 1] Hyponatremia     4/30/2021  5:14 PM Elvin Bevels Add [R79 89] Elevated serum creatinine       ED Disposition     ED Disposition Condition Date/Time Comment    Admit Stable Fri Apr 30, 2021  2:01 PM Case was discussed with Dr Lisa Sanchez and the patient's admission status was agreed to be Admission Status: inpatient status to the service of Dr Lisa Sanchez          Follow-up Information    None         Current Discharge Medication List      CONTINUE these medications which have NOT CHANGED Details   albuterol (PROVENTIL HFA,VENTOLIN HFA) 90 mcg/act inhaler Inhale 2 puffs every 6 (six) hours as needed for wheezing    Comments: Substitution to a formulary equivalent within the same pharmaceutical class is authorized  aspirin 81 mg chewable tablet Chew 81 mg daily at bedtime       budesonide-formoterol (SYMBICORT) 160-4 5 mcg/act inhaler Inhale 2 puffs 2 (two) times a day Rinse mouth after use  cyclobenzaprine (FLEXERIL) 10 mg tablet Take 10 mg by mouth daily at bedtime       HYDROCHLOROTHIAZIDE PO Take 37 5 mg by mouth 2 (two) times a day       lisinopril (ZESTRIL) 20 mg tablet Take 20 mg by mouth daily at bedtime       nebivolol (BYSTOLIC) 10 mg tablet Take 10 mg by mouth daily at bedtime       omeprazole (PriLOSEC) 40 MG capsule Take 40 mg by mouth daily      rizatriptan (MAXALT) 10 MG tablet Take 10 mg by mouth once as needed for migraine May repeat in 2 hours if needed      simvastatin (ZOCOR) 10 mg tablet Take 10 mg by mouth daily at bedtime           No discharge procedures on file      PDMP Review     None          ED Provider  Electronically Signed by           Gustavo Scott PA-C  04/30/21 2972

## 2021-05-01 LAB
ALBUMIN SERPL BCP-MCNC: 3 G/DL (ref 3.5–5)
ALP SERPL-CCNC: 85 U/L (ref 46–116)
ALT SERPL W P-5'-P-CCNC: 37 U/L (ref 12–78)
ANION GAP SERPL CALCULATED.3IONS-SCNC: 8 MMOL/L (ref 4–13)
AST SERPL W P-5'-P-CCNC: 37 U/L (ref 5–45)
BILIRUB SERPL-MCNC: 0.37 MG/DL (ref 0.2–1)
BUN SERPL-MCNC: 29 MG/DL (ref 5–25)
CALCIUM ALBUM COR SERPL-MCNC: 8.7 MG/DL (ref 8.3–10.1)
CALCIUM SERPL-MCNC: 7.9 MG/DL (ref 8.3–10.1)
CHLORIDE SERPL-SCNC: 103 MMOL/L (ref 100–108)
CO2 SERPL-SCNC: 26 MMOL/L (ref 21–32)
CREAT SERPL-MCNC: 1.35 MG/DL (ref 0.6–1.3)
CRP SERPL QL: 57 MG/L
D DIMER PPP FEU-MCNC: 0.75 UG/ML FEU
ERYTHROCYTE [DISTWIDTH] IN BLOOD BY AUTOMATED COUNT: 14.5 % (ref 11.6–15.1)
FERRITIN SERPL-MCNC: 228 NG/ML (ref 8–388)
GFR SERPL CREATININE-BSD FRML MDRD: 43 ML/MIN/1.73SQ M
GLUCOSE SERPL-MCNC: 164 MG/DL (ref 65–140)
HCT VFR BLD AUTO: 42.6 % (ref 34.8–46.1)
HGB BLD-MCNC: 13.9 G/DL (ref 11.5–15.4)
MCH RBC QN AUTO: 27.3 PG (ref 26.8–34.3)
MCHC RBC AUTO-ENTMCNC: 32.6 G/DL (ref 31.4–37.4)
MCV RBC AUTO: 84 FL (ref 82–98)
NRBC BLD AUTO-RTO: 0 /100 WBCS
PLATELET # BLD AUTO: 141 THOUSANDS/UL (ref 149–390)
PMV BLD AUTO: 11.7 FL (ref 8.9–12.7)
POTASSIUM SERPL-SCNC: 3.6 MMOL/L (ref 3.5–5.3)
PROCALCITONIN SERPL-MCNC: <0.05 NG/ML
PROT SERPL-MCNC: 7 G/DL (ref 6.4–8.2)
RBC # BLD AUTO: 5.1 MILLION/UL (ref 3.81–5.12)
SODIUM SERPL-SCNC: 137 MMOL/L (ref 136–145)
WBC # BLD AUTO: 2.85 THOUSAND/UL (ref 4.31–10.16)

## 2021-05-01 PROCEDURE — 85379 FIBRIN DEGRADATION QUANT: CPT | Performed by: STUDENT IN AN ORGANIZED HEALTH CARE EDUCATION/TRAINING PROGRAM

## 2021-05-01 PROCEDURE — 85027 COMPLETE CBC AUTOMATED: CPT | Performed by: STUDENT IN AN ORGANIZED HEALTH CARE EDUCATION/TRAINING PROGRAM

## 2021-05-01 PROCEDURE — 80053 COMPREHEN METABOLIC PANEL: CPT | Performed by: STUDENT IN AN ORGANIZED HEALTH CARE EDUCATION/TRAINING PROGRAM

## 2021-05-01 PROCEDURE — 84145 PROCALCITONIN (PCT): CPT | Performed by: STUDENT IN AN ORGANIZED HEALTH CARE EDUCATION/TRAINING PROGRAM

## 2021-05-01 PROCEDURE — 82728 ASSAY OF FERRITIN: CPT | Performed by: STUDENT IN AN ORGANIZED HEALTH CARE EDUCATION/TRAINING PROGRAM

## 2021-05-01 PROCEDURE — 99232 SBSQ HOSP IP/OBS MODERATE 35: CPT | Performed by: STUDENT IN AN ORGANIZED HEALTH CARE EDUCATION/TRAINING PROGRAM

## 2021-05-01 PROCEDURE — 86140 C-REACTIVE PROTEIN: CPT | Performed by: STUDENT IN AN ORGANIZED HEALTH CARE EDUCATION/TRAINING PROGRAM

## 2021-05-01 RX ORDER — ONDANSETRON 4 MG/1
4 TABLET, ORALLY DISINTEGRATING ORAL EVERY 6 HOURS PRN
Status: DISCONTINUED | OUTPATIENT
Start: 2021-05-01 | End: 2021-05-01

## 2021-05-01 RX ORDER — HYDROCODONE POLISTIREX AND CHLORPHENIRAMINE POLISTIREX 10; 8 MG/5ML; MG/5ML
5 SUSPENSION, EXTENDED RELEASE ORAL EVERY 12 HOURS PRN
Status: DISCONTINUED | OUTPATIENT
Start: 2021-05-01 | End: 2021-05-03 | Stop reason: HOSPADM

## 2021-05-01 RX ORDER — ONDANSETRON 2 MG/ML
4 INJECTION INTRAMUSCULAR; INTRAVENOUS EVERY 8 HOURS PRN
Status: DISCONTINUED | OUTPATIENT
Start: 2021-05-01 | End: 2021-05-03 | Stop reason: HOSPADM

## 2021-05-01 RX ADMIN — Medication 2000 UNITS: at 09:27

## 2021-05-01 RX ADMIN — GUAIFENESIN 600 MG: 600 TABLET, EXTENDED RELEASE ORAL at 09:27

## 2021-05-01 RX ADMIN — ONDANSETRON 4 MG: 2 INJECTION INTRAMUSCULAR; INTRAVENOUS at 22:22

## 2021-05-01 RX ADMIN — HYDROCODONE POLISTIREX AND CHLORPHENIRAMINE POLISTIREX 5 ML: 10; 8 SUSPENSION, EXTENDED RELEASE ORAL at 11:15

## 2021-05-01 RX ADMIN — SODIUM CHLORIDE 75 ML/HR: 0.9 INJECTION, SOLUTION INTRAVENOUS at 23:47

## 2021-05-01 RX ADMIN — BUDESONIDE AND FORMOTEROL FUMARATE DIHYDRATE 2 PUFF: 160; 4.5 AEROSOL RESPIRATORY (INHALATION) at 09:28

## 2021-05-01 RX ADMIN — CEFTRIAXONE 1000 MG: 1 INJECTION, SOLUTION INTRAVENOUS at 17:17

## 2021-05-01 RX ADMIN — OXYCODONE HYDROCHLORIDE AND ACETAMINOPHEN 1000 MG: 500 TABLET ORAL at 09:27

## 2021-05-01 RX ADMIN — HEPARIN SODIUM 5000 UNITS: 5000 INJECTION INTRAVENOUS; SUBCUTANEOUS at 06:29

## 2021-05-01 RX ADMIN — HEPARIN SODIUM 5000 UNITS: 5000 INJECTION INTRAVENOUS; SUBCUTANEOUS at 22:22

## 2021-05-01 RX ADMIN — HEPARIN SODIUM 5000 UNITS: 5000 INJECTION INTRAVENOUS; SUBCUTANEOUS at 14:38

## 2021-05-01 RX ADMIN — DEXAMETHASONE SODIUM PHOSPHATE 6 MG: 4 INJECTION INTRA-ARTICULAR; INTRALESIONAL; INTRAMUSCULAR; INTRAVENOUS; SOFT TISSUE at 17:16

## 2021-05-01 RX ADMIN — ONDANSETRON 4 MG: 2 INJECTION INTRAMUSCULAR; INTRAVENOUS at 14:38

## 2021-05-01 RX ADMIN — REMDESIVIR 100 MG: 100 INJECTION, POWDER, LYOPHILIZED, FOR SOLUTION INTRAVENOUS at 17:54

## 2021-05-01 RX ADMIN — FAMOTIDINE 20 MG: 20 TABLET ORAL at 09:29

## 2021-05-01 RX ADMIN — DOXYCYCLINE 100 MG: 100 CAPSULE ORAL at 04:10

## 2021-05-01 RX ADMIN — SODIUM CHLORIDE 75 ML/HR: 0.9 INJECTION, SOLUTION INTRAVENOUS at 14:00

## 2021-05-01 RX ADMIN — BENZONATATE 100 MG: 100 CAPSULE ORAL at 04:10

## 2021-05-01 RX ADMIN — ZINC SULFATE 220 MG (50 MG) CAPSULE 220 MG: CAPSULE at 09:27

## 2021-05-01 RX ADMIN — BUDESONIDE AND FORMOTEROL FUMARATE DIHYDRATE 2 PUFF: 160; 4.5 AEROSOL RESPIRATORY (INHALATION) at 17:16

## 2021-05-01 NOTE — PROGRESS NOTES
[address]  Progress Note - Keith Jenni 1965, 60 y o  female MRN: [id number]  Unit/Bed#: -01 Encounter: [id number]  Primary Care Provider: Yaw Jane DO   Date and time admitted to hospital: 4/30/2021 11:05 AM    Acute respiratory failure with hypoxia [facility])  Assessment & Plan  Please see plan under COVID pneumonia    * Pneumonia due to COVID-19 virus  Assessment & Plan  Patient presenting with 2 and half weeks of fatigue and some cough  Worsening shortness of breath prompted her to come to the ED  Patient currently needing 2 L of oxygen via nasal cannula  CT scan showing bilateral ground-glass opacities highly suspicious for COVID pneumonia, COVID positive  Will admit patient under mild pathway  Follow-up inflammatory markers  Follow-up procalcitonin, if negative x2 will discontinue antibiotics  Respiratory protocol  Vitamin-C, zinc, vitamin-D, remdesivir (4/30-_)  Dexamethasone  Oxygen via nasal cannula currently on 2 L  CTA negative for PE  Not a candidate for convalescent plasma as symptoms have been ongoing for more than 5 days    Acute kidney injury ([facility] [address] )  Assessment & Plan  Possibly secondary to dehydration and hypotension  No known CKD at baseline  Will gently hydrate with IV fluids  Continue to monitor creatinine  Avoid nephrotoxins  Monitor I's and O's  Avoid hypotensive episodes    Improving - 1 82 - 1 35    Essential hypertension  Assessment & Plan  Patient on hydrochlorothiazide and lisinopril at home however will hold these in light of hypotension    Gastroesophageal reflux disease without esophagitis  Assessment & Plan  Will start patient on famotidine at this time as part of mild COVID pathway  On omeprazole at home    Uncomplicated asthma  Assessment & Plan  Currently stable, no wheezing at this time, continue Symbicort and albuterol        VTE Pharmacologic Prophylaxis:   Pharmacologic: Heparin  Mechanical VTE Prophylaxis in Place: Yes    Patient Centered Rounds: I have performed bedside rounds with nursing staff today  Discussions with Specialists or Other Care Team Provider: none    Education and Discussions with Family / Patient: patient and     Time Spent for Care: 30 minutes  More than 50% of total time spent on counseling and coordination of care as described above  Current Length of Stay: 1 day(s)    Current Patient Status: Inpatient   Certification Statement: The patient will continue to require additional inpatient hospital stay due to acute hypoxic respiratory failure    Discharge Plan:  Home    Code Status: Level 1 - Full Code      Subjective:   Patient seen examined at bedside  Patient very tired and fatigue and states that she gets a headache whenever she coughs but otherwise feeling better than when she 1st came in  Review of Systems   Constitutional: Negative for chills and fever  HENT: Negative for ear pain and sore throat  Eyes: Negative for pain and visual disturbance  Respiratory: Positive for cough and shortness of breath  Cardiovascular: Negative for chest pain and palpitations  Gastrointestinal: Positive for diarrhea  Negative for abdominal pain and vomiting  Genitourinary: Negative for dysuria and hematuria  Musculoskeletal: Negative for arthralgias and back pain  Skin: Negative for color change and rash  Neurological: Positive for headaches  Negative for seizures and syncope  All other systems reviewed and are negative  Objective:     Vitals:   Temp (24hrs), Av 2 °F (36 8 °C), Min:97 5 °F (36 4 °C), Max:99 °F (37 2 °C)    Temp:  [97 5 °F (36 4 °C)-99 °F (37 2 °C)] 98 6 °F (37 °C)  HR:  [67-83] 67  Resp:  [15-21] 20  BP: ()/(53-72) 134/65  SpO2:  [91 %-100 %] 92 %  Body mass index is 41 63 kg/m²  Input and Output Summary (last 24 hours):        Intake/Output Summary (Last 24 hours) at 2021 1227  Last data filed at 2021 0800  Gross per 24 hour   Intake 4340 ml   Output --   Net 4340 ml       Physical Exam:     Physical Exam  Vitals signs and nursing note reviewed  Constitutional:       General: She is not in acute distress  Appearance: She is well-developed  HENT:      Head: Normocephalic and atraumatic  Eyes:      Conjunctiva/sclera: Conjunctivae normal    Neck:      Musculoskeletal: Neck supple  Cardiovascular:      Rate and Rhythm: Normal rate and regular rhythm  Heart sounds: No murmur  Pulmonary:      Effort: Pulmonary effort is normal  No respiratory distress  Comments: Coarse breath sounds  Comfortable on 1 L of oxygen via nasal cannula  Abdominal:      Palpations: Abdomen is soft  Tenderness: There is no abdominal tenderness  Skin:     General: Skin is warm and dry  Neurological:      Mental Status: She is alert and oriented to person, place, and time  Additional Data:     Labs:    Results from last 7 days   Lab Units 05/01/21  0512 04/30/21  1125   WBC Thousand/uL 2 85* 5 61   HEMOGLOBIN g/dL 13 9 14 4   HEMATOCRIT % 42 6 44 8   PLATELETS Thousands/uL 141* 173   LYMPHO PCT %  --  21   MONO PCT %  --  3*   EOS PCT %  --  0     Results from last 7 days   Lab Units 05/01/21  0512   SODIUM mmol/L 137   POTASSIUM mmol/L 3 6   CHLORIDE mmol/L 103   CO2 mmol/L 26   BUN mg/dL 29*   CREATININE mg/dL 1 35*   ANION GAP mmol/L 8   CALCIUM mg/dL 7 9*   ALBUMIN g/dL 3 0*   TOTAL BILIRUBIN mg/dL 0 37   ALK PHOS U/L 85   ALT U/L 37   AST U/L 37   GLUCOSE RANDOM mg/dL 164*                 Results from last 7 days   Lab Units 04/30/21  1125   LACTIC ACID mmol/L 1 4           * I Have Reviewed All Lab Data Listed Above  * Additional Pertinent Lab Tests Reviewed: [address] Admission Reviewed    Imaging:  Reviewed    Recent Cultures (last 7 days):     Results from last 7 days   Lab Units 04/30/21  1151   BLOOD CULTURE  Received in Microbiology Lab  Culture in Progress  Received in Microbiology Lab  Culture in Progress  Last 24 Hours Medication List:   Current Facility-Administered Medications   Medication Dose Route Frequency Provider Last Rate    albuterol  2 puff Inhalation Q6H PRN Emmnauel Rodriguez MD      ascorbic acid  1,000 mg Oral Q12H [facility] Emmanuel Rodriguez MD      aspirin  81 mg Oral HS Emmanuel Rodriguez MD      budesonide-formoterol  2 puff Inhalation BID Emmanuel Rodriguez MD      cefTRIAXone  1,000 mg Intravenous Q24H Emmanuel Rodriguez MD Stopped (04/30/21 1634)    cholecalciferol  2,000 Units Oral Daily Emmanuel Rodriguez MD      dexamethasone  6 mg Intravenous Q24H Emmanuel Rodriguez MD      doxycycline hyclate  100 mg Oral Q12H Emmanuel Rodriguez MD      famotidine  20 mg Oral Daily Emmanuel Rodriguez MD      guaiFENesin  600 mg Oral Q12H [facility] Emmanuel Rodriguez MD      heparin (porcine)  5,000 Units Subcutaneous [facility] Emmanuel Rodriguez MD      hydrocodone-chlorpheniramine polistirex  5 mL Oral Q12H PRN Emmanuel Rodriguez MD      zinc sulfate  220 mg Oral Daily Emmanuel Rodriguez MD      Followed by   Brinda Marroquin ON 5/7/2021] multivitamin-minerals  1 tablet Oral Daily Emmanuel Rodriguez MD      remdesivir  100 mg Intravenous Q24H Emmanuel Rodriguez MD      sodium chloride  75 mL/hr Intravenous Continuous Emmanuel Rodriguez MD 75 mL/hr (04/30/21 1722)        Today, Patient Was Seen By: Javan Roth MD    ** Please Note: Dictation voice to text software may have been used in the creation of this document   **

## 2021-05-02 LAB
ALBUMIN SERPL BCP-MCNC: 2.7 G/DL (ref 3.5–5)
ALP SERPL-CCNC: 68 U/L (ref 46–116)
ALT SERPL W P-5'-P-CCNC: 32 U/L (ref 12–78)
ANION GAP SERPL CALCULATED.3IONS-SCNC: 8 MMOL/L (ref 4–13)
AST SERPL W P-5'-P-CCNC: 28 U/L (ref 5–45)
ATRIAL RATE: 84 BPM
BASOPHILS # BLD AUTO: 0 THOUSANDS/ΜL (ref 0–0.1)
BASOPHILS NFR BLD AUTO: 0 % (ref 0–1)
BILIRUB SERPL-MCNC: 0.28 MG/DL (ref 0.2–1)
BUN SERPL-MCNC: 30 MG/DL (ref 5–25)
CALCIUM ALBUM COR SERPL-MCNC: 9.1 MG/DL (ref 8.3–10.1)
CALCIUM SERPL-MCNC: 8.1 MG/DL (ref 8.3–10.1)
CHLORIDE SERPL-SCNC: 108 MMOL/L (ref 100–108)
CO2 SERPL-SCNC: 27 MMOL/L (ref 21–32)
CREAT SERPL-MCNC: 1.2 MG/DL (ref 0.6–1.3)
CRP SERPL QL: 23.5 MG/L
D DIMER PPP FEU-MCNC: 0.52 UG/ML FEU
EOSINOPHIL # BLD AUTO: 0 THOUSAND/ΜL (ref 0–0.61)
EOSINOPHIL NFR BLD AUTO: 0 % (ref 0–6)
ERYTHROCYTE [DISTWIDTH] IN BLOOD BY AUTOMATED COUNT: 14.7 % (ref 11.6–15.1)
GFR SERPL CREATININE-BSD FRML MDRD: 50 ML/MIN/1.73SQ M
GLUCOSE SERPL-MCNC: 156 MG/DL (ref 65–140)
HCT VFR BLD AUTO: 39.8 % (ref 34.8–46.1)
HGB BLD-MCNC: 12.7 G/DL (ref 11.5–15.4)
IMM GRANULOCYTES # BLD AUTO: 0.07 THOUSAND/UL (ref 0–0.2)
IMM GRANULOCYTES NFR BLD AUTO: 2 % (ref 0–2)
LYMPHOCYTES # BLD AUTO: 0.56 THOUSANDS/ΜL (ref 0.6–4.47)
LYMPHOCYTES NFR BLD AUTO: 12 % (ref 14–44)
MCH RBC QN AUTO: 26.7 PG (ref 26.8–34.3)
MCHC RBC AUTO-ENTMCNC: 31.9 G/DL (ref 31.4–37.4)
MCV RBC AUTO: 84 FL (ref 82–98)
MONOCYTES # BLD AUTO: 0.3 THOUSAND/ΜL (ref 0.17–1.22)
MONOCYTES NFR BLD AUTO: 6 % (ref 4–12)
NEUTROPHILS # BLD AUTO: 3.83 THOUSANDS/ΜL (ref 1.85–7.62)
NEUTS SEG NFR BLD AUTO: 80 % (ref 43–75)
NRBC BLD AUTO-RTO: 0 /100 WBCS
P AXIS: 51 DEGREES
PLATELET # BLD AUTO: 144 THOUSANDS/UL (ref 149–390)
PMV BLD AUTO: 11.6 FL (ref 8.9–12.7)
POTASSIUM SERPL-SCNC: 4 MMOL/L (ref 3.5–5.3)
PR INTERVAL: 152 MS
PROCALCITONIN SERPL-MCNC: <0.05 NG/ML
PROT SERPL-MCNC: 6.1 G/DL (ref 6.4–8.2)
QRS AXIS: -47 DEGREES
QRSD INTERVAL: 82 MS
QT INTERVAL: 344 MS
QTC INTERVAL: 406 MS
RBC # BLD AUTO: 4.76 MILLION/UL (ref 3.81–5.12)
SODIUM SERPL-SCNC: 143 MMOL/L (ref 136–145)
T WAVE AXIS: 59 DEGREES
VENTRICULAR RATE: 84 BPM
WBC # BLD AUTO: 4.76 THOUSAND/UL (ref 4.31–10.16)

## 2021-05-02 PROCEDURE — 84145 PROCALCITONIN (PCT): CPT | Performed by: STUDENT IN AN ORGANIZED HEALTH CARE EDUCATION/TRAINING PROGRAM

## 2021-05-02 PROCEDURE — 85025 COMPLETE CBC W/AUTO DIFF WBC: CPT | Performed by: STUDENT IN AN ORGANIZED HEALTH CARE EDUCATION/TRAINING PROGRAM

## 2021-05-02 PROCEDURE — 80053 COMPREHEN METABOLIC PANEL: CPT | Performed by: STUDENT IN AN ORGANIZED HEALTH CARE EDUCATION/TRAINING PROGRAM

## 2021-05-02 PROCEDURE — 85379 FIBRIN DEGRADATION QUANT: CPT | Performed by: STUDENT IN AN ORGANIZED HEALTH CARE EDUCATION/TRAINING PROGRAM

## 2021-05-02 PROCEDURE — 93010 ELECTROCARDIOGRAM REPORT: CPT | Performed by: INTERNAL MEDICINE

## 2021-05-02 PROCEDURE — 86140 C-REACTIVE PROTEIN: CPT | Performed by: STUDENT IN AN ORGANIZED HEALTH CARE EDUCATION/TRAINING PROGRAM

## 2021-05-02 PROCEDURE — 99232 SBSQ HOSP IP/OBS MODERATE 35: CPT | Performed by: STUDENT IN AN ORGANIZED HEALTH CARE EDUCATION/TRAINING PROGRAM

## 2021-05-02 RX ADMIN — HEPARIN SODIUM 5000 UNITS: 5000 INJECTION INTRAVENOUS; SUBCUTANEOUS at 13:14

## 2021-05-02 RX ADMIN — DEXAMETHASONE SODIUM PHOSPHATE 6 MG: 4 INJECTION INTRA-ARTICULAR; INTRALESIONAL; INTRAMUSCULAR; INTRAVENOUS; SOFT TISSUE at 16:39

## 2021-05-02 RX ADMIN — ASPIRIN 81 MG CHEWABLE TABLET 81 MG: 81 TABLET CHEWABLE at 20:39

## 2021-05-02 RX ADMIN — GUAIFENESIN 600 MG: 600 TABLET, EXTENDED RELEASE ORAL at 20:40

## 2021-05-02 RX ADMIN — BUDESONIDE AND FORMOTEROL FUMARATE DIHYDRATE 2 PUFF: 160; 4.5 AEROSOL RESPIRATORY (INHALATION) at 10:00

## 2021-05-02 RX ADMIN — OXYCODONE HYDROCHLORIDE AND ACETAMINOPHEN 1000 MG: 500 TABLET ORAL at 20:39

## 2021-05-02 RX ADMIN — HEPARIN SODIUM 5000 UNITS: 5000 INJECTION INTRAVENOUS; SUBCUTANEOUS at 05:22

## 2021-05-02 RX ADMIN — HEPARIN SODIUM 5000 UNITS: 5000 INJECTION INTRAVENOUS; SUBCUTANEOUS at 22:46

## 2021-05-02 RX ADMIN — GUAIFENESIN 600 MG: 600 TABLET, EXTENDED RELEASE ORAL at 10:00

## 2021-05-02 RX ADMIN — OXYCODONE HYDROCHLORIDE AND ACETAMINOPHEN 1000 MG: 500 TABLET ORAL at 10:00

## 2021-05-02 RX ADMIN — DOXYCYCLINE 100 MG: 100 CAPSULE ORAL at 16:38

## 2021-05-02 RX ADMIN — DOXYCYCLINE 100 MG: 100 CAPSULE ORAL at 05:23

## 2021-05-02 RX ADMIN — FAMOTIDINE 20 MG: 10 INJECTION, SOLUTION INTRAVENOUS at 20:39

## 2021-05-02 RX ADMIN — CEFTRIAXONE 1000 MG: 1 INJECTION, SOLUTION INTRAVENOUS at 16:39

## 2021-05-02 RX ADMIN — Medication 2000 UNITS: at 10:00

## 2021-05-02 RX ADMIN — FAMOTIDINE 20 MG: 20 TABLET ORAL at 10:00

## 2021-05-02 RX ADMIN — REMDESIVIR 100 MG: 100 INJECTION, POWDER, LYOPHILIZED, FOR SOLUTION INTRAVENOUS at 18:00

## 2021-05-02 RX ADMIN — SODIUM CHLORIDE 75 ML/HR: 0.9 INJECTION, SOLUTION INTRAVENOUS at 13:15

## 2021-05-02 RX ADMIN — FAMOTIDINE 20 MG: 10 INJECTION, SOLUTION INTRAVENOUS at 11:22

## 2021-05-02 RX ADMIN — BUDESONIDE AND FORMOTEROL FUMARATE DIHYDRATE 2 PUFF: 160; 4.5 AEROSOL RESPIRATORY (INHALATION) at 18:03

## 2021-05-02 RX ADMIN — ONDANSETRON 4 MG: 2 INJECTION INTRAMUSCULAR; INTRAVENOUS at 11:10

## 2021-05-02 NOTE — ASSESSMENT & PLAN NOTE
Patient presenting with 2 and half weeks of fatigue and some cough  Worsening shortness of breath prompted her to come to the ED  Patient currently needing 2 L of oxygen via nasal cannula  CT scan showing bilateral ground-glass opacities highly suspicious for COVID pneumonia, COVID positive  Will admit patient under mild pathway  Follow-up inflammatory markers  Follow-up procalcitonin, if negative x2 will discontinue antibiotics (initial negative, second is pending)  Respiratory protocol  Vitamin-C, zinc, vitamin-D, remdesivir (4/30-_)  Dexamethasone  Oxygen via nasal cannula currently on 1L  CTA negative for PE  Not a candidate for convalescent plasma as symptoms have been ongoing for more than 5 days  One set of blood cultures positive for coagulase negative Staph, likely a contaminant is only 1 bottle is positive  Patient continues to have diarrhea at this time however is tolerating p o   In is to keep herself well hydrated to prevent further injury to her kidneys

## 2021-05-02 NOTE — ASSESSMENT & PLAN NOTE
Possibly secondary to dehydration and hypotension  No known CKD at baseline  Will gently hydrate with IV fluids  Continue to monitor creatinine  Avoid nephrotoxins  Monitor I's and O's  Avoid hypotensive episodes    Improving - 1 82 - 1 35    RESOLVED

## 2021-05-02 NOTE — PROGRESS NOTES
[address]  Progress Note - David Leslie 1965, 60 y o  female MRN: [id number]  Unit/Bed#: -01 Encounter: [id number]  Primary Care Provider: Mateo Canavan, DO   Date and time admitted to hospital: 4/30/2021 11:05 AM    Acute respiratory failure with hypoxia [facility])  Assessment & Plan  Please see plan under COVID pneumonia    * Pneumonia due to COVID-19 virus  Assessment & Plan  Patient presenting with 2 and half weeks of fatigue and some cough  Worsening shortness of breath prompted her to come to the ED  Patient currently needing 2 L of oxygen via nasal cannula  CT scan showing bilateral ground-glass opacities highly suspicious for COVID pneumonia, COVID positive  Will admit patient under mild pathway  Follow-up inflammatory markers  Follow-up procalcitonin, if negative x2 will discontinue antibiotics (initial negative, second is pending)  Respiratory protocol  Vitamin-C, zinc, vitamin-D, remdesivir (4/30-_)  Dexamethasone  Oxygen via nasal cannula currently on 1L  CTA negative for PE  Not a candidate for convalescent plasma as symptoms have been ongoing for more than 5 days  One set of blood cultures positive for coagulase negative Staph, likely a contaminant is only 1 bottle is positive  Patient continues to have diarrhea at this time however is tolerating p o   In is to keep herself well hydrated to prevent further injury to her kidneys    Acute kidney injury [facility])  Assessment & Plan  Possibly secondary to dehydration and hypotension  No known CKD at baseline  Will gently hydrate with IV fluids  Continue to monitor creatinine  Avoid nephrotoxins  Monitor I's and O's  Avoid hypotensive episodes    Improving - 1 82 - 1 35    RESOLVED    Essential hypertension  Assessment & Plan  Patient on hydrochlorothiazide and lisinopril at home however will hold these in light of hypotension    Gastroesophageal reflux disease without esophagitis  Assessment & Plan  Will start patient on famotidine at this time as part of mild COVID pathway  On omeprazole at home    Uncomplicated asthma  Assessment & Plan  Currently stable, no wheezing at this time, continue Symbicort and albuterol        VTE Pharmacologic Prophylaxis:   Pharmacologic: Heparin  Mechanical VTE Prophylaxis in Place: Yes    Patient Centered Rounds: I have performed bedside rounds with nursing staff today  Discussions with Specialists or Other Care Team Provider:  None    Education and Discussions with Family / Patient:  Patient and     Time Spent for Care: 30 minutes  More than 50% of total time spent on counseling and coordination of care as described above  Current Length of Stay: 2 day(s)    Current Patient Status: Inpatient   Certification Statement: The patient will continue to require additional inpatient hospital stay due to Acute respiratory failure with hypoxia    Discharge Plan:  Home    Code Status: Level 1 - Full Code      Subjective:   Patient seen examined at bedside  Breathing has improved however diarrhea still ongoing  Nausea is also on going at this time  Review of Systems   Constitutional: Negative for chills and fever  HENT: Negative for ear pain and sore throat  Eyes: Negative for pain and visual disturbance  Respiratory: Positive for cough  Negative for shortness of breath  Cardiovascular: Negative for chest pain and palpitations  Gastrointestinal: Positive for diarrhea, nausea and vomiting  Negative for abdominal pain  Genitourinary: Negative for dysuria and hematuria  Musculoskeletal: Negative for arthralgias and back pain  Skin: Negative for color change and rash  Neurological: Negative for seizures and syncope  All other systems reviewed and are negative           Objective:     Vitals:   Temp (24hrs), Av 7 °F (36 5 °C), Min:97 °F (36 1 °C), Max:98 °F (36 7 °C)    Temp:  [97 °F (36 1 °C)-98 °F (36 7 °C)] 98 °F (36 7 °C)  HR:  [61-74] 61  Resp:  [20-26] 20  BP: (135-151)/(63-89) 135/63  SpO2:  [93 %-100 %] 93 %  Body mass index is 41 51 kg/m²  Input and Output Summary (last 24 hours): Intake/Output Summary (Last 24 hours) at 5/2/2021 1115  Last data filed at 5/2/2021 0300  Gross per 24 hour   Intake 2010 ml   Output 200 ml   Net 1810 ml       Physical Exam:     Physical Exam  Vitals signs and nursing note reviewed  Constitutional:       General: She is not in acute distress  Appearance: She is well-developed  She is ill-appearing  HENT:      Head: Normocephalic and atraumatic  Eyes:      Conjunctiva/sclera: Conjunctivae normal    Neck:      Musculoskeletal: Neck supple  Cardiovascular:      Rate and Rhythm: Normal rate and regular rhythm  Heart sounds: No murmur  Pulmonary:      Effort: Pulmonary effort is normal  No respiratory distress  Comments: Sitting comfortably off oxygen saturating low 90s  Abdominal:      General: There is no distension  Palpations: Abdomen is soft  Tenderness: There is no abdominal tenderness  Skin:     General: Skin is warm and dry  Neurological:      Mental Status: She is alert  Additional Data:     Labs:    Results from last 7 days   Lab Units 05/02/21  0449   WBC Thousand/uL 4 76   HEMOGLOBIN g/dL 12 7   HEMATOCRIT % 39 8   PLATELETS Thousands/uL 144*   NEUTROS PCT % 80*   LYMPHS PCT % 12*   MONOS PCT % 6   EOS PCT % 0     Results from last 7 days   Lab Units 05/02/21  0449   SODIUM mmol/L 143   POTASSIUM mmol/L 4 0   CHLORIDE mmol/L 108   CO2 mmol/L 27   BUN mg/dL 30*   CREATININE mg/dL 1 20   ANION GAP mmol/L 8   CALCIUM mg/dL 8 1*   ALBUMIN g/dL 2 7*   TOTAL BILIRUBIN mg/dL 0 28   ALK PHOS U/L 68   ALT U/L 32   AST U/L 28   GLUCOSE RANDOM mg/dL 156*                 Results from last 7 days   Lab Units 05/01/21  0512 04/30/21  1125   LACTIC ACID mmol/L  --  1 4   PROCALCITONIN ng/ml <0 05  --            * I Have Reviewed All Lab Data Listed Above    * Additional Pertinent Lab Tests Reviewed: [address] Admission Reviewed    Imaging:  Reviewed    Recent Cultures (last 7 days):     Results from last 7 days   Lab Units 04/30/21  1151   BLOOD CULTURE  Staphylococcus coagulase negative*  No Growth at 24 hrs     GRAM STAIN RESULT  Gram positive cocci in clusters*       Last 24 Hours Medication List:   Current Facility-Administered Medications   Medication Dose Route Frequency Provider Last Rate    albuterol  2 puff Inhalation Q6H PRN Christina Bounds Venecia Overall, MD      ascorbic acid  1,000 mg Oral Q12H [address] [place] Bounds Venecia Overall, MD      aspirin  81 mg Oral HS Christina Bounds Venecia Overall, MD      budesonide-formoterol  2 puff Inhalation BID [place] [facility] Sofía Rodriguez MD      cefTRIAXone  1,000 mg Intravenous Q24H Christina Bounds Venecia Overall, MD 1,000 mg (05/01/21 0762)    cholecalciferol  2,000 Units Oral Daily Christina Bounds Venecia Overall, MD      dexamethasone  6 mg Intravenous Q24H [facility] Bounds Venecia Overall, MD      doxycycline hyclate  100 mg Oral Q12H [place] [facility] Venecia Overall, MD      famotidine  20 mg Intravenous Q12H [address] Christina Bounds Venecia Overall, MD      guaiFENesin  600 mg Oral Q12H [address] [place] Bounds Venecia Overall, MD      heparin (porcine)  5,000 Units Subcutaneous [facility] [address] [place] Bounds Venecia Overall, MD      hydrocodone-chlorpheniramine polistirex  5 mL Oral Q12H PRN [place] [facility] Venecia Overall, MD      zinc sulfate  220 mg Oral Daily Christina Bounds Venecia Overall, MD      Followed by   Caleb Coughlin ON 5/7/2021] multivitamin-minerals  1 tablet Oral Daily [place] [facility] Sofía Rodriguez MD      ondansetron  4 mg Intravenous Q8H PRN Christina Bounds Venecia Overall, MD      remdesivir  100 mg Intravenous Q24H [place] [facility] Venecia Overall, MD      sodium chloride  75 mL/hr Intravenous Continuous [place] B[facility] Edgar Singh MD 75 mL/hr (05/01/21 0761)        Today, Patient Was Seen By: Kermit Humphries MD    ** Please Note: Dictation voice to text software may have been used in the creation of this document   **

## 2021-05-02 NOTE — PLAN OF CARE
Problem: PAIN - ADULT  Goal: Verbalizes/displays adequate comfort level or baseline comfort level  Description: Interventions:  - Encourage patient to monitor pain and request assistance  - Assess pain using appropriate pain scale  - Administer analgesics based on type and severity of pain and evaluate response  - Implement non-pharmacological measures as appropriate and evaluate response  - Consider cultural and social influences on pain and pain management  - Notify physician/advanced practitioner if interventions unsuccessful or patient reports new pain  Outcome: Progressing     Problem: INFECTION - ADULT  Goal: Absence or prevention of progression during hospitalization  Description: INTERVENTIONS:  - Assess and monitor for signs and symptoms of infection  - Monitor lab/diagnostic results  - Monitor all insertion sites, i e  indwelling lines, tubes, and drains  - Monitor endotracheal if appropriate and nasal secretions for changes in amount and color  - [place] appropriate cooling/warming therapies per order  - Administer medications as ordered  - Instruct and encourage patient and family to use good hand hygiene technique  - Identify and instruct in appropriate isolation precautions for identified infection/condition  Outcome: Progressing  Goal: Absence of fever/infection during neutropenic period  Description: INTERVENTIONS:  - Monitor WBC    Outcome: Progressing     Problem: SAFETY ADULT  Goal: Patient will remain free of falls  Description: INTERVENTIONS:  - Assess patient frequently for physical needs  -  Identify cognitive and physical deficits and behaviors that affect risk of falls    -  [place] fall precautions as indicated by assessment   - Educate patient/family on patient safety including physical limitations  - Instruct patient to call for assistance with activity based on assessment  - Modify environment to reduce risk of injury  - Consider OT/PT consult to assist with strengthening/mobility  Outcome: Progressing  Goal: Maintain or return to baseline ADL function  Description: INTERVENTIONS:  -  Assess patient's ability to carry out ADLs; assess patient's baseline for ADL function and identify physical deficits which impact ability to perform ADLs (bathing, care of mouth/teeth, toileting, grooming, dressing, etc )  - Assess/evaluate cause of self-care deficits   - Assess range of motion  - Assess patient's mobility; develop plan if impaired  - Assess patient's need for assistive devices and provide as appropriate  - Encourage maximum independence but intervene and supervise when necessary  - Involve family in performance of ADLs  - Assess for home care needs following discharge   - Consider OT consult to assist with ADL evaluation and planning for discharge  - Provide patient education as appropriate  Outcome: Progressing  Goal: Maintain or return mobility status to optimal level  Description: INTERVENTIONS:  - Assess patient's baseline mobility status (ambulation, transfers, stairs, etc )    - Identify cognitive and physical deficits and behaviors that affect mobility  - Identify mobility aids required to assist with transfers and/or ambulation (gait belt, sit-to-stand, lift, walker, cane, etc )  - [place] fall precautions as indicated by assessment  - Record patient progress and toleration of activity level on Mobility SBAR; progress patient to next Phase/Stage  - Instruct patient to call for assistance with activity based on assessment  - Consider rehabilitation consult to assist with strengthening/weightbearing, etc   Outcome: Progressing     Problem: DISCHARGE PLANNING  Goal: Discharge to home or other facility with appropriate resources  Description: INTERVENTIONS:  - Identify barriers to discharge w/patient and caregiver  - Arrange for needed discharge resources and transportation as appropriate  - Identify discharge learning needs (meds, wound care, etc )  - Arrange for interpretive services to assist at discharge as needed  - Refer to Case Management Department for coordinating discharge planning if the patient needs post-hospital services based on physician/advanced practitioner order or complex needs related to functional status, cognitive ability, or social support system  Outcome: Progressing     Problem: Knowledge Deficit  Goal: Patient/family/caregiver demonstrates understanding of disease process, treatment plan, medications, and discharge instructions  Description: Complete learning assessment and assess knowledge base  Interventions:  - Provide teaching at level of understanding  - Provide teaching via preferred learning methods  Outcome: Progressing     Problem: Potential for Falls  Goal: Patient will remain free of falls  Description: INTERVENTIONS:  - Assess patient frequently for physical needs  -  Identify cognitive and physical deficits and behaviors that affect risk of falls    -  [place] fall precautions as indicated by assessment   - Educate patient/family on patient safety including physical limitations  - Instruct patient to call for assistance with activity based on assessment  - Modify environment to reduce risk of injury  - Consider OT/PT consult to assist with strengthening/mobility  Outcome: Progressing

## 2021-05-02 NOTE — PLAN OF CARE
Problem: PAIN - ADULT  Goal: Verbalizes/displays adequate comfort level or baseline comfort level  Description: Interventions:  - Encourage patient to monitor pain and request assistance  - Assess pain using appropriate pain scale  - Administer analgesics based on type and severity of pain and evaluate response  - Implement non-pharmacological measures as appropriate and evaluate response  - Consider cultural and social influences on pain and pain management  - Notify physician/advanced practitioner if interventions unsuccessful or patient reports new pain  Outcome: Progressing     Problem: INFECTION - ADULT  Goal: Absence or prevention of progression during hospitalization  Description: INTERVENTIONS:  - Assess and monitor for signs and symptoms of infection  - Monitor lab/diagnostic results  - Monitor all insertion sites, i e  indwelling lines, tubes, and drains  - Monitor endotracheal if appropriate and nasal secretions for changes in amount and color  - [place] appropriate cooling/warming therapies per order  - Administer medications as ordered  - Instruct and encourage patient and family to use good hand hygiene technique  - Identify and instruct in appropriate isolation precautions for identified infection/condition  Outcome: Progressing  Goal: Absence of fever/infection during neutropenic period  Description: INTERVENTIONS:  - Monitor WBC    Outcome: Progressing     Problem: SAFETY ADULT  Goal: Patient will remain free of falls  Description: INTERVENTIONS:  - Assess patient frequently for physical needs  -  Identify cognitive and physical deficits and behaviors that affect risk of falls    -  [place] fall precautions as indicated by assessment   - Educate patient/family on patient safety including physical limitations  - Instruct patient to call for assistance with activity based on assessment  - Modify environment to reduce risk of injury  - Consider OT/PT consult to assist with strengthening/mobility  Outcome: Progressing  Goal: Maintain or return to baseline ADL function  Description: INTERVENTIONS:  -  Assess patient's ability to carry out ADLs; assess patient's baseline for ADL function and identify physical deficits which impact ability to perform ADLs (bathing, care of mouth/teeth, toileting, grooming, dressing, etc )  - Assess/evaluate cause of self-care deficits   - Assess range of motion  - Assess patient's mobility; develop plan if impaired  - Assess patient's need for assistive devices and provide as appropriate  - Encourage maximum independence but intervene and supervise when necessary  - Involve family in performance of ADLs  - Assess for home care needs following discharge   - Consider OT consult to assist with ADL evaluation and planning for discharge  - Provide patient education as appropriate  Outcome: Progressing  Goal: Maintain or return mobility status to optimal level  Description: INTERVENTIONS:  - Assess patient's baseline mobility status (ambulation, transfers, stairs, etc )    - Identify cognitive and physical deficits and behaviors that affect mobility  - Identify mobility aids required to assist with transfers and/or ambulation (gait belt, sit-to-stand, lift, walker, cane, etc )  - [place] fall precautions as indicated by assessment  - Record patient progress and toleration of activity level on Mobility SBAR; progress patient to next Phase/Stage  - Instruct patient to call for assistance with activity based on assessment  - Consider rehabilitation consult to assist with strengthening/weightbearing, etc   Outcome: Progressing     Problem: DISCHARGE PLANNING  Goal: Discharge to home or other facility with appropriate resources  Description: INTERVENTIONS:  - Identify barriers to discharge w/patient and caregiver  - Arrange for needed discharge resources and transportation as appropriate  - Identify discharge learning needs (meds, wound care, etc )  - Arrange for interpretive services to assist at discharge as needed  - Refer to Case Management Department for coordinating discharge planning if the patient needs post-hospital services based on physician/advanced practitioner order or complex needs related to functional status, cognitive ability, or social support system  Outcome: Progressing     Problem: Knowledge Deficit  Goal: Patient/family/caregiver demonstrates understanding of disease process, treatment plan, medications, and discharge instructions  Description: Complete learning assessment and assess knowledge base  Interventions:  - Provide teaching at level of understanding  - Provide teaching via preferred learning methods  Outcome: Progressing     Problem: Potential for Falls  Goal: Patient will remain free of falls  Description: INTERVENTIONS:  - Assess patient frequently for physical needs  -  Identify cognitive and physical deficits and behaviors that affect risk of falls    -  [place] fall precautions as indicated by assessment   - Educate patient/family on patient safety including physical limitations  - Instruct patient to call for assistance with activity based on assessment  - Modify environment to reduce risk of injury  - Consider OT/PT consult to assist with strengthening/mobility  Outcome: Progressing

## 2021-05-03 VITALS
RESPIRATION RATE: 19 BRPM | BODY MASS INDEX: 41.59 KG/M2 | HEIGHT: 66 IN | HEART RATE: 46 BPM | WEIGHT: 258.8 LBS | OXYGEN SATURATION: 96 % | SYSTOLIC BLOOD PRESSURE: 139 MMHG | DIASTOLIC BLOOD PRESSURE: 63 MMHG | TEMPERATURE: 98.1 F

## 2021-05-03 LAB
ALBUMIN SERPL BCP-MCNC: 2.6 G/DL (ref 3.5–5)
ALP SERPL-CCNC: 69 U/L (ref 46–116)
ALT SERPL W P-5'-P-CCNC: 43 U/L (ref 12–78)
ANION GAP SERPL CALCULATED.3IONS-SCNC: 11 MMOL/L (ref 4–13)
AST SERPL W P-5'-P-CCNC: 39 U/L (ref 5–45)
BACTERIA BLD CULT: ABNORMAL
BASOPHILS # BLD AUTO: 0.01 THOUSANDS/ΜL (ref 0–0.1)
BASOPHILS NFR BLD AUTO: 0 % (ref 0–1)
BILIRUB SERPL-MCNC: 0.35 MG/DL (ref 0.2–1)
BUN SERPL-MCNC: 30 MG/DL (ref 5–25)
CALCIUM ALBUM COR SERPL-MCNC: 9 MG/DL (ref 8.3–10.1)
CALCIUM SERPL-MCNC: 7.9 MG/DL (ref 8.3–10.1)
CHLORIDE SERPL-SCNC: 106 MMOL/L (ref 100–108)
CO2 SERPL-SCNC: 24 MMOL/L (ref 21–32)
CREAT SERPL-MCNC: 1.27 MG/DL (ref 0.6–1.3)
CRP SERPL QL: 15.5 MG/L
D DIMER PPP FEU-MCNC: 0.64 UG/ML FEU
EOSINOPHIL # BLD AUTO: 0 THOUSAND/ΜL (ref 0–0.61)
EOSINOPHIL NFR BLD AUTO: 0 % (ref 0–6)
ERYTHROCYTE [DISTWIDTH] IN BLOOD BY AUTOMATED COUNT: 14.6 % (ref 11.6–15.1)
FERRITIN SERPL-MCNC: 137 NG/ML (ref 8–388)
GFR SERPL CREATININE-BSD FRML MDRD: 47 ML/MIN/1.73SQ M
GLUCOSE SERPL-MCNC: 136 MG/DL (ref 65–140)
GRAM STN SPEC: ABNORMAL
HCT VFR BLD AUTO: 38.7 % (ref 34.8–46.1)
HGB BLD-MCNC: 12.3 G/DL (ref 11.5–15.4)
IMM GRANULOCYTES # BLD AUTO: 0.08 THOUSAND/UL (ref 0–0.2)
IMM GRANULOCYTES NFR BLD AUTO: 2 % (ref 0–2)
LYMPHOCYTES # BLD AUTO: 0.7 THOUSANDS/ΜL (ref 0.6–4.47)
LYMPHOCYTES NFR BLD AUTO: 13 % (ref 14–44)
MCH RBC QN AUTO: 26.7 PG (ref 26.8–34.3)
MCHC RBC AUTO-ENTMCNC: 31.8 G/DL (ref 31.4–37.4)
MCV RBC AUTO: 84 FL (ref 82–98)
MONOCYTES # BLD AUTO: 0.34 THOUSAND/ΜL (ref 0.17–1.22)
MONOCYTES NFR BLD AUTO: 7 % (ref 4–12)
NEUTROPHILS # BLD AUTO: 4.13 THOUSANDS/ΜL (ref 1.85–7.62)
NEUTS SEG NFR BLD AUTO: 78 % (ref 43–75)
NRBC BLD AUTO-RTO: 0 /100 WBCS
PLATELET # BLD AUTO: 162 THOUSANDS/UL (ref 149–390)
PMV BLD AUTO: 11.6 FL (ref 8.9–12.7)
POTASSIUM SERPL-SCNC: 3.3 MMOL/L (ref 3.5–5.3)
PROT SERPL-MCNC: 5.9 G/DL (ref 6.4–8.2)
RBC # BLD AUTO: 4.6 MILLION/UL (ref 3.81–5.12)
SODIUM SERPL-SCNC: 141 MMOL/L (ref 136–145)
WBC # BLD AUTO: 5.26 THOUSAND/UL (ref 4.31–10.16)

## 2021-05-03 PROCEDURE — 99239 HOSP IP/OBS DSCHRG MGMT >30: CPT | Performed by: STUDENT IN AN ORGANIZED HEALTH CARE EDUCATION/TRAINING PROGRAM

## 2021-05-03 PROCEDURE — 86140 C-REACTIVE PROTEIN: CPT | Performed by: STUDENT IN AN ORGANIZED HEALTH CARE EDUCATION/TRAINING PROGRAM

## 2021-05-03 PROCEDURE — 82728 ASSAY OF FERRITIN: CPT | Performed by: STUDENT IN AN ORGANIZED HEALTH CARE EDUCATION/TRAINING PROGRAM

## 2021-05-03 PROCEDURE — 85379 FIBRIN DEGRADATION QUANT: CPT | Performed by: STUDENT IN AN ORGANIZED HEALTH CARE EDUCATION/TRAINING PROGRAM

## 2021-05-03 PROCEDURE — 80053 COMPREHEN METABOLIC PANEL: CPT | Performed by: STUDENT IN AN ORGANIZED HEALTH CARE EDUCATION/TRAINING PROGRAM

## 2021-05-03 PROCEDURE — 85025 COMPLETE CBC W/AUTO DIFF WBC: CPT | Performed by: STUDENT IN AN ORGANIZED HEALTH CARE EDUCATION/TRAINING PROGRAM

## 2021-05-03 RX ORDER — LOPERAMIDE HYDROCHLORIDE 2 MG/1
2 CAPSULE ORAL 2 TIMES DAILY PRN
Status: DISCONTINUED | OUTPATIENT
Start: 2021-05-03 | End: 2021-05-03 | Stop reason: HOSPADM

## 2021-05-03 RX ORDER — POTASSIUM CHLORIDE 20 MEQ/1
40 TABLET, EXTENDED RELEASE ORAL ONCE
Status: COMPLETED | OUTPATIENT
Start: 2021-05-03 | End: 2021-05-03

## 2021-05-03 RX ORDER — MULTIVITAMIN/IRON/FOLIC ACID 18MG-0.4MG
1 TABLET ORAL DAILY
Qty: 30 TABLET | Refills: 0 | Status: SHIPPED | OUTPATIENT
Start: 2021-05-07 | End: 2021-06-06

## 2021-05-03 RX ADMIN — GUAIFENESIN 600 MG: 600 TABLET, EXTENDED RELEASE ORAL at 08:10

## 2021-05-03 RX ADMIN — POTASSIUM CHLORIDE 40 MEQ: 1500 TABLET, EXTENDED RELEASE ORAL at 09:11

## 2021-05-03 RX ADMIN — LOPERAMIDE HYDROCHLORIDE 2 MG: 2 CAPSULE ORAL at 04:18

## 2021-05-03 RX ADMIN — OXYCODONE HYDROCHLORIDE AND ACETAMINOPHEN 1000 MG: 500 TABLET ORAL at 08:10

## 2021-05-03 RX ADMIN — DOXYCYCLINE 100 MG: 100 CAPSULE ORAL at 04:17

## 2021-05-03 RX ADMIN — POTASSIUM CHLORIDE 40 MEQ: 1500 TABLET, EXTENDED RELEASE ORAL at 06:10

## 2021-05-03 RX ADMIN — HEPARIN SODIUM 5000 UNITS: 5000 INJECTION INTRAVENOUS; SUBCUTANEOUS at 06:10

## 2021-05-03 RX ADMIN — BUDESONIDE AND FORMOTEROL FUMARATE DIHYDRATE 2 PUFF: 160; 4.5 AEROSOL RESPIRATORY (INHALATION) at 08:09

## 2021-05-03 RX ADMIN — FAMOTIDINE 20 MG: 10 INJECTION, SOLUTION INTRAVENOUS at 08:10

## 2021-05-03 RX ADMIN — SODIUM CHLORIDE 75 ML/HR: 0.9 INJECTION, SOLUTION INTRAVENOUS at 02:49

## 2021-05-03 RX ADMIN — REMDESIVIR 100 MG: 100 INJECTION, POWDER, LYOPHILIZED, FOR SOLUTION INTRAVENOUS at 11:49

## 2021-05-03 RX ADMIN — ZINC SULFATE 220 MG (50 MG) CAPSULE 220 MG: CAPSULE at 08:10

## 2021-05-03 RX ADMIN — Medication 2000 UNITS: at 08:10

## 2021-05-03 NOTE — PROGRESS NOTES
Discussed in huddle:  Pt is going home today with her spouse who is also here with COVID  CM met with patient at the bedside,baseline information  was obtained  CM discussed the role of CM in helping the patient develop a discharge plan and assist the patient in carry out their plan  CM was unaware of discharge today  CM spoke to patient at the bedside, reviewed DC IMM with patient and informed that patient can stay an additional 4 hours for reconsidering appealing the discharge as the medicare rights were review on the day of discharge  Pt verbalized understanding and feels ready to go home and does not intend to stay 4 hours to reconsider  _ Copy was provided to patient by nursing       05/03/21 [address]   Patient Information   Mental Status Alert   Primary Caregiver Self   Support System Immediate family   Activities of Daily Living Prior to Admission   Functional Status Independent   Assistive Device No device needed   Living [address] [facility]: [place] [address]         Patient has identified:   Bonnie Benitez as her primary    + POA and Advance Directive  POA is her spouse  Encourage patient to bring in a copy of the POA/Advance Directive in the future       PCP:   Leigh Bajwa          Pt has a prescription plan and uses  [facility]  Patient is Not a [place]:   Pt denies SA/MH history       House set up: multistory home  Functional level PTA: I/PTA  DME use: none  Prior use of Home Care or STR: None  Transportation: + driver__ pts sister Margo Dickerson will be picking up patient and spouse to go home  Community Resources: None    CM reviewed d/c planning process including the following: identifying help at home, patient preference for d/c planning needs, availability of treatment team to discuss questions or concerns patient and/or family may have regarding understanding medications and recognizing signs and symptoms once discharged  CM also encouraged patient to follow up with all recommended appointments after discharge  Patient advised of importance for patient and family to participate in managing patients medical well being  Discussed dc needs with Kerrie_  Her sister will  her medications  With a readmission score of 16 have advised patient to please follow up with her PCP in 3-5 days and see if MD is doing video or telephonic visits  Pt does not need 02  DC plan is home with family support

## 2021-05-03 NOTE — PROGRESS NOTES
Pt reports poor appetite x 2 weeks, reports only tolerating crackers and water PTA  Says she had some toast and juice at B this AM  Reports she has lost weight, says she previously weighed 370lb at unknown timeframe, ?accuracy of this report  Pt with weight range of 256 - 260lb since admission  Does have hx of CHF  Given reported poor PO at this time, will hold off on salt restriction  Will order magic cup BID  Pt declined diet ed at this time

## 2021-05-03 NOTE — MALNUTRITION/BMI
This medical record reflects one or more clinical indicators suggestive of morbid obesity  BMI Findings:  Adult BMI Classifications: Morbid Obesity 40-44 9     Body mass index is 41 77 kg/m²  See Nutrition note dated 5/3/21 for additional details  Completed nutrition assessment is viewable in the nutrition documentation

## 2021-05-03 NOTE — DISCHARGE INSTRUCTIONS
Acute Kidney Injury   WHAT YOU NEED TO KNOW:   Acute kidney injury (TASHI) is also called acute kidney failure, or acute renal failure  TASHI happens when your kidneys suddenly stop working correctly  Normally, the kidneys remove fluid, chemicals, and waste from your blood  These wastes are turned into urine by your kidneys  TASHI usually happens over hours or days  When you have TASHI, your kidneys do not remove the waste, chemicals, or extra fluid from your body  A normal amount of urine is not produced  TASHI is usually temporary, it can take days to months to recover  TASHI can also become a chronic kidney condition  DISCHARGE INSTRUCTIONS:   Call 911 if:   · You have sudden chest pain or trouble breathing  Seek care immediately if:   · Your symptoms get worse  Contact your healthcare provider if:   · Your symptoms return  · Your blood sugar or blood pressure level is not within the range your healthcare provider recommends  · You have questions or concerns about your condition or care  Nutrition:  Your healthcare provider may tell you to eat food low in sodium (salt), potassium, phosphorus, or protein  A dietitian can help you plan your meals  Drink liquids as directed: Your healthcare provider may recommend that you drink a certain amount of liquids  This will help your kidneys work better and decrease your risk for dehydration  Ask how much liquid to drink each day and which liquids are best for you  Prevent acute kidney injury:   · Manage other health conditions  such as diabetes, high blood pressure, or heart disease  These conditions increase your risk for acute kidney injury  Take your medicines for these conditions as directed  Also, monitor your blood sugar and blood pressure levels as directed  Contact your healthcare provider if your levels are not in the range he or she says it should be  · Talk to your healthcare provider before you take over-the-counter-medicine    NSAIDs, stomach medicine, or laxatives may harm your kidneys and increase your risk for acute kidney injury  If it is okay to take the medicine, follow the directions on the package  Do not take more than directed  · Tell healthcare providers you have had acute kidney injury  before you get contrast liquid for an x-ray or CT scan  Your healthcare provider may give you medicine to prevent kidney problems caused by the liquid  Follow up with your healthcare provider as directed: You will need to return for more tests to make sure your kidneys are working properly  You may also be referred to a kidney specialist  Write down your questions so you remember to ask them during your visits  © Copyright [address] Information is for End User's use only and may not be sold, redistributed or otherwise used for commercial purposes  All illustrations and images included in CareNotes® are the copyrighted property of [employer] M [employer] Inc  or [employer]   The above information is an  only  It is not intended as medical advice for individual conditions or treatments  Talk to your doctor, nurse or pharmacist before following any medical regimen to see if it is safe and effective for you  COVID-19 (Coronavirus Disease 2019)   WHAT YOU NEED TO KNOW:   COVID-19 is the disease caused by the novel (new) coronavirus first discovered in December 2019  Coronaviruses generally cause upper respiratory (nose, throat, and lung) infections, such as a cold  The new virus can also cause serious lower respiratory conditions, such as pneumonia or acute respiratory distress syndrome (ARDS)  Anyone can develop serious problems from the new virus, but your risk is higher if you are 65 or older  A weak immune system, diabetes, or a heart or lung condition can also increase your risk    DISCHARGE INSTRUCTIONS:   If you think you or someone you know may be infected:  Do the following to protect others:  · If emergency care is needed, tell the  about the possible infection, or call ahead and tell the emergency department  · Call a healthcare provider  for instructions if symptoms are mild  Anyone who may be infected should not  arrive without calling first  The provider will need to protect staff members and other patients  · The person who may be infected needs to wear a face covering  while getting medical care  This will help lower the risk of infecting others  Coverings are not used for anyone who is younger than 2 years, has breathing problems, or cannot remove it  The provider can give you instructions for anyone who cannot wear a covering  Call your local emergency number (911 in the [address] [place] [address] [address]) or go to the emergency department if:   · You have trouble breathing or shortness of breath at rest     · You have chest pain or pressure that lasts longer than 5 minutes  · You become confused or hard to wake  · Your lips or face are blue  · You have a fever of 104°F (40°C) or higher  Call your doctor if:   · You do not  have symptoms of COVID-19 but had close physical contact within 14 days with someone who tested positive  · You have questions or concerns about your condition or care  Medicines: You may need any of the following:  · Decongestants  help reduce nasal congestion and help you breathe more easily  If you take decongestant pills, they may make you feel restless or cause problems with your sleep  Do not use decongestant sprays for more than a few days  · Cough suppressants  help reduce coughing  Ask your healthcare provider which type of cough medicine is best for you  · Acetaminophen  decreases pain and fever  It is available without a doctor's order  Ask how much to take and how often to take it  Follow directions   Read the labels of all other medicines you are using to see if they also contain acetaminophen, or ask your doctor or pharmacist  Acetaminophen can cause liver damage if not taken correctly  Do not use more than 4 grams (4,000 milligrams) total of acetaminophen in one day  · NSAIDs , such as ibuprofen, help decrease swelling, pain, and fever  NSAIDs can cause stomach bleeding or kidney problems in certain people  If you take blood thinner medicine, always ask your healthcare provider if NSAIDs are safe for you  Always read the medicine label and follow directions  · Take your medicine as directed  Contact your healthcare provider if you think your medicine is not helping or if you have side effects  Tell him or her if you are allergic to any medicine  Keep a list of the medicines, vitamins, and herbs you take  Include the amounts, and when and why you take them  Bring the list or the pill bottles to follow-up visits  Carry your medicine list with you in case of an emergency  How the 2019 coronavirus spreads: The virus spreads quickly and easily  You can become infected if you are in contact with a large amount of the virus, even for a short time  You can also become infected by being around a small amount of virus for a long time  The following are ways the virus is thought to spread, but more information may be coming:  · Droplets are the most common way all coronaviruses spread  The virus can travel in droplets that form when a person talks, coughs, or sneezes  Anyone who breathes in the droplets or gets them in his or her eyes can become infected with the virus  Close personal contact with an infected person is thought to be the main way the virus spreads  Close personal contact means you are within 6 feet (2 meters) of the person  · Person-to-person contact can spread the virus  For example, a person with the virus on his or her hands can spread it by shaking hands with someone  At this time, it does not appear that the virus can be passed to a baby during pregnancy or delivery  The baby can be infected after he or she is born through person-to-person contact   The virus also does not appear to spread in breast milk  If you are pregnant or breastfeeding, talk to your healthcare provider or obstetrician about any concerns you have  · The virus can stay on objects and surfaces  A person can get the virus on his or her hands by touching the object or surface  Infection happens if the person then touches his or her eyes or mouth with unwashed hands  It is not yet known how long the virus can stay on an object or surface  That is why it is important to clean all surfaces that are used regularly  · An infected animal may be able to infect a person who touches it  This may happen at live markets or on a farm  How everyone can lower the risk for COVID-19:  The best way to prevent infection is to avoid anyone who is infected, but this can be hard to do  An infected person can spread the virus before signs or symptoms begin, or even if signs or symptoms never develop  The following can help lower the risk for infection:      · Wash your hands often throughout the day  Use soap and water  Rub your soapy hands together, lacing your fingers  Wash the front and back of each hand, and in between your fingers  Use the fingers of one hand to scrub under the fingernails of the other hand  Wash for at least 20 seconds  Rinse with warm, running water for several seconds  Then dry your hands with a clean towel or paper towel  Use hand  that contains alcohol if soap and water are not available  Do not touch your eyes, nose, or mouth without washing your hands first  Teach children how to wash their hands and use hand   · Cover a sneeze or cough  This prevents droplets from traveling from you to others  Turn your face away and cover your mouth and nose with a tissue  Throw the tissue away  Use the bend of your arm if a tissue is not available  Then wash your hands well with soap and water or use hand    Turn and cover your face if you are around someone who is sneezing or coughing  Teach children how to cover a cough or sneeze  · Follow worldwide, national, and local social distancing guidelines  Social distancing means people avoid close physical contact so the virus cannot spread from one person to another  Keep at least 6 feet (2 meters) between you and others  Also keep this distance from anyone who comes to your home, such as someone making a delivery  · Make a habit of not touching your face  It is not known how long the virus can stay on objects and surfaces  If you get the virus on your hands, you can transfer it to your eyes, nose, or mouth and become infected  You can also transfer it to objects, surfaces, or people  Be aware of what you touch when you go out  Examples include handrails and elevator buttons  Try not to touch anything with bare hands unless it is necessary  Wash your hands before you leave your home and when you return  · Clean and disinfect high-touch surfaces and objects often  Use a disinfecting solution or wipes  You can make a solution by diluting 4 teaspoons of bleach in 1 quart (4 cups) of water  Clean and disinfect even if you think no one living in or coming to your home is infected with the virus  You can wipe items with a disinfecting cloth before you bring them into your home  Wash your hands after you handle anything you bring into your home  · Make your immune system as healthy as possible  A weakened immune system makes you more vulnerable to the new coronavirus  No COVID-19 vaccine is available yet  Vaccines such as the flu and pneumonia vaccines can help your immune system  Your healthcare provider can tell you which vaccines to get, and when to get them  Keep your immune system as strong as possible  Do not smoke  Eat healthy foods, exercise regularly, and try to manage stress  Go to bed and wake up at the same times each day  Social distancing guidelines:  National and local social distancing rules vary  Rules may change over time as restrictions are lifted  Restrictions may return if an outbreak happens where you live  It is important to know and follow all current social distancing rules in your area  The following are general guidelines:  · Limit trips out of your home  You may be able to have food, medicines, and other supplies delivered  If possible, have delivered items left at your door or other area  Try not to have someone hand you an item  You will be so close to the person that the virus can spread between you  · Do not have close physical contact with anyone who does not live in your home  Do not shake hands with, hug, or kiss a person as a greeting  Stand or walk as far from others as possible  If you must use public transportation (such as a bus or subway), try to sit or stand away from others  You can stay safely connected with others through phone calls, e-mail messages, social media websites, and video chats  Check in on anyone who may be having a hard time socially distancing, or who lives alone  Ask administrators at nursing homes or long-term care facilities how you can safely communicate with someone living there  · Wear a cloth face covering around others who do not live in your home  Face coverings help prevent the virus from spreading to others in droplets  You can use a clear face covering if someone needs to read your lips  This is a cloth covering that has plastic over the mouth area so your lips can be seen  Do not use coverings that have breathing valves or vents  The virus can travel out of the valve or vent and be spread to others  Do not take your covering off to talk, cough, or sneeze  Do not use coverings on children younger than 2 years or on anyone who has breathing problems or cannot remove it  · Only allow medical or other necessary professionals into your home  Wear your face covering, and remind professionals to wear a face covering   Remind them to wash their hands when they arrive and before they leave  Do not  let anyone who does not live in your home in, even if the person is not sick  A person can pass the virus to others before symptoms of COVID-19 begin  Some people never even develop symptoms  Children commonly have mild symptoms or no symptoms  It may be hard to tell a child not to hug or kiss you  Explain that this is how he or she can help you stay healthy  · Do not go to someone else's home unless it is necessary  Do not go over to visit, even if the person is lonely  Only go if you need to help him or her  Make sure you both wear face coverings while you are there  · Avoid large gatherings and crowds  Gatherings or crowds of 10 or more individuals can cause the virus to spread  Examples of gatherings include parties, sporting events, Mosque services, and conferences  Crowds may form at beaches, carroll, and tourist attractions  Protect yourself by staying away from large gatherings and crowds  · Ask your healthcare provider for other ways to have appointments  You may be able to have appointments without having to go into the provider's office  Some providers offer phone, video, or other types of appointments  You may also be able to get prescriptions for a few months of your medicines at a time  · Stay safe if you must go out to work  You may have a job that can only be done outside your home  Keep physical distance between you and other workers as much as possible  Follow your employer's rules so everyone stays safe  If you have COVID-19 and are recovering at home:  Healthcare providers will give you specific instructions to follow  The following are general guidelines to remind you how to keep others safe until you are well:  · Wash your hands often  Use soap and water as much as possible  You can use hand  that contains alcohol if soap and water are not available  Do not share towels with anyone   If you use paper towels, throw them away in a lined trash can kept in your room or area  Use a covered trash can, if possible  · Do not go out of your home unless it is necessary  You may have to go to your healthcare provider's office for check-ups or to get prescription refills  Do not arrive at the provider's office without an appointment  Providers have to make their offices safe for staff and other patients  · Do not have close physical contact with anyone unless it is necessary  Only have close physical contact with a person giving direct care, or a baby or child you must care for  Family members and friends should not visit you  If possible, stay in a separate area or room of your home if you live with others  No one should go into the area or room except to give you care  You can visit with others by phone, video chat, e-mail, or similar systems  It is important to stay connected with others in your life while you recover  · Wear a face covering while others are near you  This can help prevent droplets from spreading the virus when you talk, sneeze, or cough  Put the covering on before anyone comes into your room or area  Remind the person to cover his or her nose and mouth before going in to provide care for you  · Do not share items  Do not share dishes, towels, or other items with anyone  Items need to be washed after you use them  · Protect your baby  Wash your hands with soap and water often throughout the day  Wear a clean face covering while you breastfeed, or while you express or pump breast milk  If possible, ask someone who is well to care for your baby  You can put breast milk in bottles for the person to use, if needed  Talk to your healthcare provider if you have any questions or concerns about caring for or bonding with your baby  He or she will tell you when to bring your baby in for check-ups and vaccines   He or she will also tell you what to do if you think your baby was infected with the new virus     · Do not handle live animals  Until more is known, it is best not to touch, play with, or handle live animals  Some animals, including pets, have been infected with the new coronavirus  Do not handle or care for animals until you are well  Care includes feeding, petting, and cuddling your pet  Do not let your pet lick you or share your food  Ask someone who is not infected to take care of your pet, if possible  If you must care for a pet, wear a face covering  Wash your hands before and after you give care  · Follow directions from your healthcare provider for being around others after you recover  You will need to wait at least 10 days after symptoms first appeared  Then you will need to have no fever for 24 hours without fever medicine, and no other symptoms  A loss of taste or smell may continue for several months  It is considered okay to be around others if this is your only symptom  It is not known for sure if or for how long a recovered person can pass the virus to others  Your provider may give you instructions, such as continuing social distancing or wearing a face covering around others  How to take care of someone who has COVID-19:  If the person lives in another home, arrange for a time to give care  Remember to bring a few pairs of disposable gloves and a cloth face covering  The following are general guidelines to help you safely care for anyone who has COVID-19:  · Wash your hands often  Wash before and after you go into the person's home, area, or room  Throw paper towels away in a lined trash can that has a lid, if possible  · Do not allow others to go near the person  No one should come into the person's home unless it is necessary  If possible, the person should be in a separate area or room if he or she lives with others  Keep the room's door shut unless you need to go in or out  Have others call, video chat, or e-mail the person if he or she is feeling well enough   The person may feel lonely if he or she is kept separate for a long period of time  Safe communication can help him or her stay connected to family and friends  · Make sure the person's room has good air flow  You may be able to open the window if the weather allows  An air conditioner can also be turned on to help air move  · Contact the person before you go in to give care  Make sure the person is wearing a face covering  Remind him or her to wash his or her hands with soap and water  He or she can use hand  that contains alcohol if soap and water are not available  Put on a face covering before you go in to give care  · Wear gloves while you give care and clean  Clean items the person uses often  Clean countertops, cooking surfaces, and the fronts and insides of the microwave and refrigerator  Clean the shower, toilet, the area around the toilet, the sink, the area around the sink, and faucets  Gather used laundry or bedding  Wash and dry items on the warmest settings the fabric allows  Wash dishes and silverware in hot, soapy water or in a   · Anything you throw away needs to go into a lined trash can  When you need to empty the trash, close the open end of the lining and tie it closed  This helps prevent items the virus is on from spilling out of the trash  Remove your gloves and throw them away  Wash your hands  Follow up with your doctor as directed:  Write down your questions so you remember to ask them during your visits  For more information:   · Centers for Disease Control and Prevention  [address] , [address]  Phone: 5- 290 - [phone]  Web Address: [employer] com br    © Copyright [address] [place] [address] Drive Information is for End User's use only and may not be sold, redistributed or otherwise used for commercial purposes   All illustrations and images included in CareNotes® are the copyrighted property of A D A M , Inc  or Marquis Briseno  The above information is an  only  It is not intended as medical advice for individual conditions or treatments  Talk to your doctor, nurse or pharmacist before following any medical regimen to see if it is safe and effective for you

## 2021-05-03 NOTE — NURSING NOTE
Pt awake, alert and oriented  Resp easy and regular  Iv site d/c'd intact  Belongings packed and sent with pt  Assisted getting dressed  Discharge instructions given and reviewed with pt, verb understanding  Taken to vehicle with  via w/c to d/c home

## 2021-05-03 NOTE — DISCHARGE SUMMARY
[address]  Discharge- [facility] 1965, 60 y o  female MRN: [id number]  Unit/Bed#: -01 Encounter: [id number]  Primary Care Provider: Mehdi Barber DO   Date and time admitted to hospital: 4/30/2021 11:05 AM    Acute respiratory failure with hypoxia [facility])  Assessment & Plan  Please see plan under COVID pneumonia    * Pneumonia due to COVID-19 virus  Assessment & Plan  Patient presenting with 2 and half weeks of fatigue and some cough  Worsening shortness of breath prompted her to come to the ED  Patient was initially needing 2 L of oxygen via nasal cannula however has significantly improved and is currently off oxygen and saturating in the 90s%  CT scan showing bilateral ground-glass opacities highly suspicious for COVID pneumonia, COVID positive  Admitted under mild pathway  Follow-up inflammatory markers  Procalcitonin negative x2  Respiratory protocol  Vitamin-C, zinc, vitamin-D, remdesivir (4/30-5/3) - patient stating she feels much better today and would like to go home with her , because she is not currently on any oxygen supplementation patient may receive her 4th dose of the severe today and be discharged thereafter  Dexamethasone  CTA negative for PE  Not a candidate for convalescent plasma as symptoms have been ongoing for more than 5 days  One set of blood cultures positive for coagulase negative Staph, likely a contaminant is only 1 bottle is positive  Patient continues to have diarrhea at this time however is tolerating p o  In is to keep herself well hydrated to prevent further injury to her kidneys    Acute kidney injury [facility])  Assessment & Plan  Possibly secondary to dehydration and hypotension  No known CKD at baseline  Will gently hydrate with IV fluids  Continue to monitor creatinine  Avoid nephrotoxins  Monitor I's and O's  Avoid hypotensive episodes    Improved with improved p o   Intake as well as IV fluid hydration    RESOLVED    Essential hypertension  Assessment & Plan  Patient on hydrochlorothiazide and lisinopril at home however will hold these in light of hypotension    Gastroesophageal reflux disease without esophagitis  Assessment & Plan  Will start patient on famotidine at this time as part of mild COVID pathway  On omeprazole at home    Uncomplicated asthma  Assessment & Plan  Currently stable, no wheezing at this time, continue Symbicort and albuterol        Discharging Physician / Practitioner: Mary Lou Johansen MD  PCP: Leslie Thorpe DO  Admission Date:   Admission Orders (From admission, onward)     Ordered        04/30/21 1406  Inpatient Admission  Once                   Discharge Date: 05/03/21        Consultations During Hospital Stay:  · None    Procedures Performed:   · None    Significant Findings / Test Results:   CTA ED chest PE Study   Final Result by Freya Meyer MD (04/30 1331)      1  No evidence of pulmonary embolism   2  Multifocal bilateral groundglass opacities in the lungs  In the setting of COVID-19 infection, findings compatible with viral pneumonia   3  Mild mediastinal and hilar adenopathy, likely reactive   4  Bilateral nephrolithiasis                     Workstation performed: [id number]         XR chest 1 view portable   Final Result by Norman Gonzalez MD (04/30 1433)      Patchy opacity left lung base suspicious for possible Covid related infiltrate                  Workstation performed: [id number]         ·   ·     Incidental Findings:   · none    Test Results Pending at Discharge (will require follow up):   · none     Outpatient Tests Requested:  · none    Complications:  none    Reason for Admission:  Acute hypoxic respiratory failure, acute kidney injury in setting of persistent diarrhea and COVID-19    Hospital Course:     Clary Harris is a 60 y o  female patient who originally presented to the hospital on 4/30/2021 due to worsening weakness and shortness of breath    Patient was found to be COVID positive on admission and was admitted under the mild pathway  Antibiotics started, vitamin-C vitamin-D zinc were started as well as well as remdesivir therapy  Patient also presented with an TASHI for which IV fluid hydration was started  After patient's course of treatment, she demonstrated significant improvement in her symptoms with subsequent resolution of her TASHI  Patient states that her diarrhea has improved as well  After her 1st day of admission patient was no longer needing oxygen supplementation via nasal cannula  Patient is completing a 4 day course of remdesivir  She is wanting to be discharged either with her  as she feels significantly better  Patient is to follow-up with her primary care provider within 7 days of discharge  Please see above list of diagnoses and related plan for additional information  Condition at Discharge: stable     Discharge Day Visit / Exam:     Subjective:  Patient seen examined at bedside  Significantly improved in terms of her symptoms  Patient states that she still is having diarrhea however this is significantly less from when she 1st came in  Patient does not report any problems breathing  Vitals: Blood Pressure: 139/63 (05/03/21 0700)  Pulse: (!) 46 (05/03/21 0700)  Temperature: 98 1 °F (36 7 °C) (05/03/21 0700)  Temp Source: Oral (05/03/21 0700)  Respirations: 19 (05/03/21 0700)  Height: 5' 6" (167 6 cm) (05/03/21 0917)  Weight - Scale: 117 kg (258 lb 12 8 oz) (05/03/21 0400)  SpO2: 96 % (05/03/21 0700)    Exam:   Physical Exam  Vitals signs and nursing note reviewed  Constitutional:       General: She is not in acute distress  Appearance: She is well-developed  HENT:      Head: Normocephalic and atraumatic  Eyes:      Conjunctiva/sclera: Conjunctivae normal    Neck:      Musculoskeletal: Neck supple  Cardiovascular:      Rate and Rhythm: Normal rate and regular rhythm  Heart sounds: No murmur     Pulmonary: Effort: Pulmonary effort is normal  No respiratory distress  Comments: Breathing comfortably and saturating well on room air  Abdominal:      Palpations: Abdomen is soft  Tenderness: There is no abdominal tenderness  Skin:     General: Skin is warm and dry  Neurological:      Mental Status: She is alert  Discussion with Family:  With patient's     Discharge instructions/Information to patient and family:   See after visit summary for information provided to patient and family  Provisions for Follow-Up Care:  See after visit summary for information related to follow-up care and any pertinent home health orders  Disposition:     Home    For Discharges to [facility] SNF:   · Not Applicable to this Patient - Not Applicable to this Patient    Planned Readmission:  None     Discharge Statement:  I spent 40 minutes discharging the patient  This time was spent on the day of discharge  I had direct contact with the patient on the day of discharge  Greater than 50% of the total time was spent examining patient, answering all patient questions, arranging and discussing plan of care with patient as well as directly providing post-discharge instructions  Additional time then spent on discharge activities  Discharge Medications:  See after visit summary for reconciled discharge medications provided to patient and family        ** Please Note: This note has been constructed using a voice recognition system **

## 2021-05-03 NOTE — PLAN OF CARE
Problem: PAIN - ADULT  Goal: Verbalizes/displays adequate comfort level or baseline comfort level  Description: Interventions:  - Encourage patient to monitor pain and request assistance  - Assess pain using appropriate pain scale  - Administer analgesics based on type and severity of pain and evaluate response  - Implement non-pharmacological measures as appropriate and evaluate response  - Consider cultural and social influences on pain and pain management  - Notify physician/advanced practitioner if interventions unsuccessful or patient reports new pain  Outcome: Progressing     Problem: INFECTION - ADULT  Goal: Absence or prevention of progression during hospitalization  Description: INTERVENTIONS:  - Assess and monitor for signs and symptoms of infection  - Monitor lab/diagnostic results  - Monitor all insertion sites, i e  indwelling lines, tubes, and drains  - Monitor endotracheal if appropriate and nasal secretions for changes in amount and color  - [place] appropriate cooling/warming therapies per order  - Administer medications as ordered  - Instruct and encourage patient and family to use good hand hygiene technique  - Identify and instruct in appropriate isolation precautions for identified infection/condition  Outcome: Progressing     Problem: SAFETY ADULT  Goal: Patient will remain free of falls  Description: INTERVENTIONS:  - Assess patient frequently for physical needs  -  Identify cognitive and physical deficits and behaviors that affect risk of falls    -  [place] fall precautions as indicated by assessment   - Educate patient/family on patient safety including physical limitations  - Instruct patient to call for assistance with activity based on assessment  - Modify environment to reduce risk of injury  - Consider OT/PT consult to assist with strengthening/mobility  Outcome: Progressing  Goal: Maintain or return to baseline ADL function  Description: INTERVENTIONS:  -  Assess patient's ability to carry out ADLs; assess patient's baseline for ADL function and identify physical deficits which impact ability to perform ADLs (bathing, care of mouth/teeth, toileting, grooming, dressing, etc )  - Assess/evaluate cause of self-care deficits   - Assess range of motion  - Assess patient's mobility; develop plan if impaired  - Assess patient's need for assistive devices and provide as appropriate  - Encourage maximum independence but intervene and supervise when necessary  - Involve family in performance of ADLs  - Assess for home care needs following discharge   - Consider OT consult to assist with ADL evaluation and planning for discharge  - Provide patient education as appropriate  Outcome: Progressing  Goal: Maintain or return mobility status to optimal level  Description: INTERVENTIONS:  - Assess patient's baseline mobility status (ambulation, transfers, stairs, etc )    - Identify cognitive and physical deficits and behaviors that affect mobility  - Identify mobility aids required to assist with transfers and/or ambulation (gait belt, sit-to-stand, lift, walker, cane, etc )  - [place] fall precautions as indicated by assessment  - Record patient progress and toleration of activity level on Mobility SBAR; progress patient to next Phase/Stage  - Instruct patient to call for assistance with activity based on assessment  - Consider rehabilitation consult to assist with strengthening/weightbearing, etc   Outcome: Progressing     Problem: DISCHARGE PLANNING  Goal: Discharge to home or other facility with appropriate resources  Description: INTERVENTIONS:  - Identify barriers to discharge w/patient and caregiver  - Arrange for needed discharge resources and transportation as appropriate  - Identify discharge learning needs (meds, wound care, etc )  - Arrange for interpretive services to assist at discharge as needed  - Refer to Case Management Department for coordinating discharge planning if the patient needs post-hospital services based on physician/advanced practitioner order or complex needs related to functional status, cognitive ability, or social support system  Outcome: Progressing     Problem: Knowledge Deficit  Goal: Patient/family/caregiver demonstrates understanding of disease process, treatment plan, medications, and discharge instructions  Description: Complete learning assessment and assess knowledge base  Interventions:  - Provide teaching at level of understanding  - Provide teaching via preferred learning methods  Outcome: Progressing     Problem: Potential for Falls  Goal: Patient will remain free of falls  Description: INTERVENTIONS:  - Assess patient frequently for physical needs  -  Identify cognitive and physical deficits and behaviors that affect risk of falls    -  [place] fall precautions as indicated by assessment   - Educate patient/family on patient safety including physical limitations  - Instruct patient to call for assistance with activity based on assessment  - Modify environment to reduce risk of injury  - Consider OT/PT consult to assist with strengthening/mobility  Outcome: Progressing     Problem: Prexisting or High Potential for Compromised Skin Integrity  Goal: Skin integrity is maintained or improved  Description: INTERVENTIONS:  - Identify patients at risk for skin breakdown  - Assess and monitor skin integrity  - Assess and monitor nutrition and hydration status  - Monitor labs   - Assess for incontinence   - Turn and reposition patient  - Assist with mobility/ambulation  - Relieve pressure over bony prominences  - Avoid friction and shearing  - Provide appropriate hygiene as needed including keeping skin clean and dry  - Evaluate need for skin moisturizer/barrier cream  - Collaborate with interdisciplinary team   - Patient/family teaching  - Consider wound care consult   Outcome: Progressing     Problem: RESPIRATORY - ADULT  Goal: Achieves optimal ventilation and oxygenation  Description: INTERVENTIONS:  - Assess for changes in respiratory status  - Assess for changes in mentation and behavior  - Position to facilitate oxygenation and minimize respiratory effort  - Oxygen administered by appropriate delivery if ordered  - Initiate smoking cessation education as indicated  - Encourage broncho-pulmonary hygiene including cough, deep breathe, Incentive Spirometry  - Assess the need for suctioning and aspirate as needed  - Assess and instruct to report SOB or any respiratory difficulty  - Respiratory Therapy support as indicated  Outcome: Progressing     Problem: GASTROINTESTINAL - ADULT  Goal: Minimal or absence of nausea and/or vomiting  Description: INTERVENTIONS:  - Administer IV fluids if ordered to ensure adequate hydration  - Maintain NPO status until nausea and vomiting are resolved  - Nasogastric tube if ordered  - Administer ordered antiemetic medications as needed  - Provide nonpharmacologic comfort measures as appropriate  - Advance diet as tolerated, if ordered  - Consider nutrition services referral to assist patient with adequate nutrition and appropriate food choices  Outcome: Progressing  Goal: Maintains or returns to baseline bowel function  Description: INTERVENTIONS:  - Assess bowel function  - Encourage oral fluids to ensure adequate hydration  - Administer IV fluids if ordered to ensure adequate hydration  - Administer ordered medications as needed  - Encourage mobilization and activity  - Consider nutritional services referral to assist patient with adequate nutrition and appropriate food choices  Outcome: Progressing  Goal: Maintains adequate nutritional intake  Description: INTERVENTIONS:  - Monitor percentage of each meal consumed  - Identify factors contributing to decreased intake, treat as appropriate  - Assist with meals as needed  - Monitor I&O, weight, and lab values if indicated  - Obtain nutrition services referral as needed  Outcome: Progressing     Problem: METABOLIC, FLUID AND ELECTROLYTES - ADULT  Goal: Electrolytes maintained within normal limits  Description: INTERVENTIONS:  - Monitor labs and assess patient for signs and symptoms of electrolyte imbalances  - Administer electrolyte replacement as ordered  - Monitor response to electrolyte replacements, including repeat lab results as appropriate  - Instruct patient on fluid and nutrition as appropriate  Outcome: Progressing     Problem: Nutrition/Hydration-ADULT  Goal: Nutrient/Hydration intake appropriate for improving, restoring or maintaining nutritional needs  Description: Monitor and assess patient's nutrition/hydration status for malnutrition  Collaborate with interdisciplinary team and initiate plan and interventions as ordered  Monitor patient's weight and dietary intake as ordered or per policy  Utilize nutrition screening tool and intervene as necessary  Determine patient's food preferences and provide high-protein, high-caloric foods as appropriate       INTERVENTIONS:  - Monitor oral intake, urinary output, labs, and treatment plans  - Assess nutrition and hydration status and recommend course of action  - Evaluate amount of meals eaten  - Assist patient with eating if necessary   - Allow adequate time for meals  - Recommend/ encourage appropriate diets, oral nutritional supplements, and vitamin/mineral supplements  - Order, calculate, and assess calorie counts as needed  - Recommend, monitor, and adjust tube feedings and TPN/PPN based on assessed needs  - Assess need for intravenous fluids  - Provide specific nutrition/hydration education as appropriate  - Include patient/family/caregiver in decisions related to nutrition  Outcome: Progressing

## 2021-05-03 NOTE — ASSESSMENT & PLAN NOTE
Patient presenting with 2 and half weeks of fatigue and some cough  Worsening shortness of breath prompted her to come to the ED  Patient was initially needing 2 L of oxygen via nasal cannula however has significantly improved and is currently off oxygen and saturating in the 90s%  CT scan showing bilateral ground-glass opacities highly suspicious for COVID pneumonia, COVID positive  Admitted under mild pathway  Follow-up inflammatory markers  Procalcitonin negative x2  Respiratory protocol  Vitamin-C, zinc, vitamin-D, remdesivir (4/30-5/3) - patient stating she feels much better today and would like to go home with her , because she is not currently on any oxygen supplementation patient may receive her 4th dose of the severe today and be discharged thereafter  Dexamethasone  CTA negative for PE  Not a candidate for convalescent plasma as symptoms have been ongoing for more than 5 days  One set of blood cultures positive for coagulase negative Staph, likely a contaminant is only 1 bottle is positive  Patient continues to have diarrhea at this time however is tolerating p o   In is to keep herself well hydrated to prevent further injury to her kidneys

## 2021-05-03 NOTE — PLAN OF CARE
Pt weaned from O2 and is now on RA  O2 sats remain low to mid 90's with rest and OOB to [facility]  Pt continues to have small loose Bms  Pt has had a total of 5 this shift  Imodium given x1  Pt also continues to IVF  Pt has had no N/V but poor appetite  Labs drawn and K 3 3  SLIM notified of abnormal value  Awaiting response  Pt's nutritional and GI status has not improved  Will continue to monitor and intervene as necessary to improve outcomes      Problem: PAIN - ADULT  Goal: Verbalizes/displays adequate comfort level or baseline comfort level  Description: Interventions:  - Encourage patient to monitor pain and request assistance  - Assess pain using appropriate pain scale  - Administer analgesics based on type and severity of pain and evaluate response  - Implement non-pharmacological measures as appropriate and evaluate response  - Consider cultural and social influences on pain and pain management  - Notify physician/advanced practitioner if interventions unsuccessful or patient reports new pain  5/3/2021 0530 by Constance Alicea RN  Outcome: Progressing  5/3/2021 0529 by Constance Alicea RN  Outcome: Progressing     Problem: GASTROINTESTINAL - ADULT  Goal: Minimal or absence of nausea and/or vomiting  Description: INTERVENTIONS:  - Administer IV fluids if ordered to ensure adequate hydration  - Maintain NPO status until nausea and vomiting are resolved  - Nasogastric tube if ordered  - Administer ordered antiemetic medications as needed  - Provide nonpharmacologic comfort measures as appropriate  - Advance diet as tolerated, if ordered  - Consider nutrition services referral to assist patient with adequate nutrition and appropriate food choices  5/3/2021 0530 by Constance Alicea RN  Outcome: Not Progressing  5/3/2021 0529 by Constance Alicea RN  Outcome: Not Progressing  Goal: Maintains or returns to baseline bowel function  Description: INTERVENTIONS:  - Assess bowel function  - Encourage oral fluids to ensure adequate hydration  - Administer IV fluids if ordered to ensure adequate hydration  - Administer ordered medications as needed  - Encourage mobilization and activity  - Consider nutritional services referral to assist patient with adequate nutrition and appropriate food choices  5/3/2021 0530 by Giovanni Paul RN  Outcome: Not Progressing  5/3/2021 0529 by Giovanni Paul RN  Outcome: Not Progressing  Goal: Maintains adequate nutritional intake  Description: INTERVENTIONS:  - Monitor percentage of each meal consumed  - Identify factors contributing to decreased intake, treat as appropriate  - Assist with meals as needed  - Monitor I&O, weight, and lab values if indicated  - Obtain nutrition services referral as needed  5/3/2021 0530 by Giovanni Paul RN  Outcome: Not Progressing  5/3/2021 0529 by Giovanni Paul RN  Outcome: Not Progressing     Problem: METABOLIC, FLUID AND ELECTROLYTES - ADULT  Goal: Electrolytes maintained within normal limits  Description: INTERVENTIONS:  - Monitor labs and assess patient for signs and symptoms of electrolyte imbalances  - Administer electrolyte replacement as ordered  - Monitor response to electrolyte replacements, including repeat lab results as appropriate  - Instruct patient on fluid and nutrition as appropriate  Outcome: Not Progressing

## 2021-05-03 NOTE — ASSESSMENT & PLAN NOTE
Possibly secondary to dehydration and hypotension  No known CKD at baseline  Will gently hydrate with IV fluids  Continue to monitor creatinine  Avoid nephrotoxins  Monitor I's and O's  Avoid hypotensive episodes    Improved with improved p o   Intake as well as IV fluid hydration    RESOLVED

## 2021-05-05 LAB — BACTERIA BLD CULT: NORMAL

## 2022-10-12 PROBLEM — U07.1 PNEUMONIA DUE TO COVID-19 VIRUS: Status: RESOLVED | Noted: 2021-04-30 | Resolved: 2022-10-12

## 2022-10-12 PROBLEM — J12.82 PNEUMONIA DUE TO COVID-19 VIRUS: Status: RESOLVED | Noted: 2021-04-30 | Resolved: 2022-10-12

## 2024-08-26 ENCOUNTER — EVALUATION (OUTPATIENT)
Dept: PHYSICAL THERAPY | Facility: CLINIC | Age: 59
End: 2024-08-26
Payer: MEDICARE

## 2024-08-26 DIAGNOSIS — I89.0 LYMPHEDEMA: Primary | ICD-10-CM

## 2024-08-26 PROCEDURE — 97535 SELF CARE MNGMENT TRAINING: CPT

## 2024-08-26 PROCEDURE — 97161 PT EVAL LOW COMPLEX 20 MIN: CPT | Performed by: PHYSICAL THERAPIST

## 2024-08-26 NOTE — PROGRESS NOTES
PT Evaluation     Today's date: 2024  Patient name: Kerrie Hoffman  : 1965  MRN: [id number]  Referring provider: Celi Chong,*  Dx:   Encounter Diagnosis     ICD-10-CM    1. Lymphedema  I89.0                        Plan  Patient would benefit from: Follow up if necessary post operatively  Planned therapy interventions: None at this time      Patient presents for pre-operative measurement and consult for lymphedema. She is having axillary nodes at R breast removed but surgical date not set yet. We spent time educating her and , answering questions, discussing post-op expectations.  Will re-assess post surgical as needed.        STG  - Patient and/or caregiver will understand lymphedema precautions to decrease risk of infection and exacerbation of lymphedema  - Patient and/or caregiver will understand signs and symptoms of lymphedema  - Patient will have preoperative measurements for baseline.        Frequency (visits/wk): 1x visit Pre-Op  Duration (weeks):1 week  POC Start date:2024  POC End date: 24    Discussed with: Patient and     Subjective    Kerrie Hoffman is a 59 y.o. year old female who presents to IE for discussion/consult of lymphedema. 5 months ago was dx with right side breast cancer. Had lumpectomy and 4 lymphnodes removed. After further testing and second opinion, she will be pursuing additional lymph node removal (axillary) and then initiate chemotherapy. There is no definitive date for surgery as of now. Today she is looking to have consult and education on procedure after therapy.    Does have hx of CHF that is controlled with meds. Denies hx of DVT. Had acute kidney injury with medication a few years ago but this has passed and no other kidney issues noted.    She is accompanied by . She is not currently working. Was injured at work, with notable neck and back injuries    Kerrie presents to IE for preoperative measurements prior to lymph node dissection.     Diagnosed in: 2024  Swelling history     - Family history: no     - CARLITOS: yes Breast Cancer     - Surgery: yes Lumpectomy     - PMH Cancer: yes Melinoma     - Lymph node removal: yes R axillary     - Radiation therapy for cancer: no     - Chemotherapy: no     -  Infections (cellulitis): no     -  Exercise history: no    - Previous treatment: No    - Currently wear a compression garment: No  Loss of function/ mobility: No    - Dressing: no     - Lifting: no     - Reaching feet and toes: no     - Bathing/ showering: no     - Preparing meals: no      General Medical History      - Pregnant or chance may be pregnant: no       - Obesity: yes        - Smoking: no       - Hyperthyroidism: no       - Orthopedic history: Cervical Fusion 4,5,6: Laminectomy S 1,2  Gastrointestinal:      - Abdominal surgeries: yes Tubal ligation Hysterectomy Partial      - Crohn's disease: no      - Diverticulitis: no   Genitourinary       - Kidney dysfunction: yes Stones      - Chronic renal insufficiency: no      - Incontinence: no      - Saddle anesthesia: no      - Bowel and bladder changes: no       Cardiovascular/ Respiratory history     - HTN: yes       - Normal BP: 130/90     - Medications: yes ; how long: 10 years     - Asthma: yes inhaler      - Bronchitis: no      - Irregular heartbeat: no      - Sleep apnea: no      - Diabetes: yes Type 2      - Heart disease: yes CHF      - Regular follow-ups with cardiologist: yes       - Family history of CVD: yes Mom and Dad BP issues      - Clotting disorders: no      - Varicose veins: no      - PMH wounds: no      - PMH DVT: no   Home setting: Single family dwelling  Support:   Goals: Prevent post op swelling    Objective - Fredis Fowler, PTA, CLT    Physical assessment: R UE appears equal to L UE  Palpation:no firmness or swelling noted.   Skin Mobility: good  Skin color: normal  Pittin  Wound presence: no wounds present  Wound size: n/a  Wound color: n/a  Stemmer's Sign:  -     Transfer status: I              Precautions: CHF (controlled with meds)    Daily Treatment Diary:      Initial Evaluation Date: 08/26/24  POC Expiration: na  Compliance 08/26/24                     Visit Number 1                    Re-Eval  IE                 MC   Foto Captured Y                            Manuals 8/26            measurements sj            education sj            Self care sj            MLD             Neuro Re-Ed                                                                                                        Ther Ex                                                                                                                     Ther Activity                                       Gait Training                                       Modalities                                          PT reviewed with patient lymphedema education of skin care including: keeping extremity clean and dry, applying moisturizer daily, special attention to nail care, wearing sunscreen avoiding nicks and skin irritation from razors, wearing gloves with activities that may cause skin injury.   Also reviewed for patient to avoid excessive constriction such as tight clothing and jewelry, extreme temperature changes, and the importance of compliance for bandaging and garments.   Patient also advised to contact physician if experiencing any constitutional symptoms, swelling, redness, pain, rash, itching, or increased skin temperature.

## 2024-08-26 NOTE — LETTER
2024    Celi Chong PA-C  [address] [facility]  [place] [address]    Patient: Kerrie Hoffman   YOB: 1965   Date of Visit: 2024     Encounter Diagnosis     ICD-10-CM    1. Lymphedema  I89.0           Dear Dr. Chong:    Thank you for your recent referral of Kerrie Hoffman. Please review the attached evaluation summary from Kerrie's recent visit.     Please verify that you agree with the plan of care by signing the attached order.     If you have any questions or concerns, please do not hesitate to call.     I sincerely appreciate the opportunity to share in the care of one of your patients and hope to have another opportunity to work with you in the near future.       Sincerely,    Louie Gentile, PT      Referring Provider:      I certify that I have read the below Plan of Care and certify the need for these services furnished under this plan of treatment while under my care.                    Celi Chong PA-C  [address] [facility]  [place] [address]  Via Fax: [phone]          PT Evaluation     Today's date: 2024  Patient name: Kerrie Hoffman  : 1965  MRN: [id number]  Referring provider: Celi Chong,*  Dx:   Encounter Diagnosis     ICD-10-CM    1. Lymphedema  I89.0                        Plan  Patient would benefit from: Follow up if necessary post operatively  Planned therapy interventions: None at this time      Patient presents for pre-operative measurement and consult for lymphedema. She is having axillary nodes at R breast removed but surgical date not set yet. We spent time educating her and , answering questions, discussing post-op expectations.  Will re-assess post surgical as needed.        STG  - Patient and/or caregiver will understand lymphedema precautions to decrease risk of infection and exacerbation of lymphedema  - Patient and/or caregiver will understand signs and symptoms of  lymphedema  - Patient will have preoperative measurements for baseline.        Frequency (visits/wk): 1x visit Pre-Op  Duration (weeks):1 week  POC Start date:8/26/2024  POC End date: 9/2/24    Discussed with: Patient and     Subjective    Kerrie Hoffman is a 59 y.o. year old female who presents to IE for discussion/consult of lymphedema. 5 months ago was dx with right side breast cancer. Had lumpectomy and 4 lymphnodes removed. After further testing and second opinion, she will be pursuing additional lymph node removal (axillary) and then initiate chemotherapy. There is no definitive date for surgery as of now. Today she is looking to have consult and education on procedure after therapy.    Does have hx of CHF that is controlled with meds. Denies hx of DVT. Had acute kidney injury with medication a few years ago but this has passed and no other kidney issues noted.    She is accompanied by . She is not currently working. Was injured at work, with notable neck and back injuries    Kerrie presents to IE for preoperative measurements prior to lymph node dissection.    Diagnosed in: June 2024  Swelling history     - Family history: no     - CARLITOS: yes Breast Cancer     - Surgery: yes Lumpectomy     - PMH Cancer: yes Melinoma     - Lymph node removal: yes R axillary     - Radiation therapy for cancer: no     - Chemotherapy: no     -  Infections (cellulitis): no     -  Exercise history: no    - Previous treatment: No    - Currently wear a compression garment: No  Loss of function/ mobility: No    - Dressing: no     - Lifting: no     - Reaching feet and toes: no     - Bathing/ showering: no     - Preparing meals: no      General Medical History      - Pregnant or chance may be pregnant: no       - Obesity: yes        - Smoking: no       - Hyperthyroidism: no       - Orthopedic history: Cervical Fusion 4,5,6: Laminectomy S 1,2  Gastrointestinal:      - Abdominal surgeries: yes Tubal ligation Hysterectomy Partial       - Crohn's disease: no      - Diverticulitis: no   Genitourinary       - Kidney dysfunction: yes Stones      - Chronic renal insufficiency: no      - Incontinence: no      - Saddle anesthesia: no      - Bowel and bladder changes: no       Cardiovascular/ Respiratory history     - HTN: yes       - Normal BP: 130/90     - Medications: yes ; how long: 10 years     - Asthma: yes inhaler      - Bronchitis: no      - Irregular heartbeat: no      - Sleep apnea: no      - Diabetes: yes Type 2      - Heart disease: yes CHF      - Regular follow-ups with cardiologist: yes       - Family history of CVD: yes Mom and Dad BP issues      - Clotting disorders: no      - Varicose veins: no      - PMH wounds: no      - PMH DVT: no   Home setting: Single family dwelling  Support:   Goals: Prevent post op swelling    Objective - Fredis Fowler PTA, CLT    Physical assessment: R UE appears equal to L UE  Palpation:no firmness or swelling noted.   Skin Mobility: good  Skin color: normal  Pittin  Wound presence: no wounds present  Wound size: n/a  Wound color: n/a  Stemmer's Sign: -     Transfer status: I              Precautions: CHF (controlled with meds)    Daily Treatment Diary:      Initial Evaluation Date: 24  POC Expiration: na  Compliance 24                     Visit Number 1                    Re-Eval  IE                 MC   Foto Captured Y                            Manuals             measurements sj            education sj            Self care sj            MLD             Neuro Re-Ed                                                                                                        Ther Ex                                                                                                                     Ther Activity                                       Gait Training                                       Modalities                                          PT reviewed with patient lymphedema  education of skin care including: keeping extremity clean and dry, applying moisturizer daily, special attention to nail care, wearing sunscreen avoiding nicks and skin irritation from razors, wearing gloves with activities that may cause skin injury.   Also reviewed for patient to avoid excessive constriction such as tight clothing and jewelry, extreme temperature changes, and the importance of compliance for bandaging and garments.   Patient also advised to contact physician if experiencing any constitutional symptoms, swelling, redness, pain, rash, itching, or increased skin temperature.

## 2024-10-10 ENCOUNTER — EVALUATION (OUTPATIENT)
Dept: PHYSICAL THERAPY | Facility: CLINIC | Age: 59
End: 2024-10-10
Payer: MEDICARE

## 2024-10-10 DIAGNOSIS — I89.0 LYMPHEDEMA: Primary | ICD-10-CM

## 2024-10-10 PROCEDURE — 97535 SELF CARE MNGMENT TRAINING: CPT

## 2024-10-10 PROCEDURE — 97140 MANUAL THERAPY 1/> REGIONS: CPT

## 2024-10-10 PROCEDURE — 97110 THERAPEUTIC EXERCISES: CPT

## 2024-10-10 NOTE — PROGRESS NOTES
"PT Re-Evaluation     Today's date: 10/10/2024  Patient name: Kerrie Hoffman  : 1965  MRN: [id number]  Referring provider: Celi Chong,*  Dx:   Encounter Diagnosis     ICD-10-CM    1. Lymphedema  I89.0                          Plan  Patient would benefit from: Follow up if necessary post chemotherapy  Planned therapy interventions: None at this time      Patient presents for post operative measurement and consult for lymphedema. She had axillary nodes at R breast removed.  We spent time educating her and , answering questions, discussing chemo and radiation expectations.  Will re-assess post chemotherapy as needed.        STG  - Patient and/or caregiver will understand lymphedema precautions to decrease risk of infection and exacerbation of lymphedema  - Patient and/or caregiver will understand signs and symptoms of lymphedema  -Patient will be I with HEP  - Patient will have post Chemotherapy measurements for comparison.        Frequency (visits/wk): 1x visit Pre-Chemo  Duration (weeks):1 week  POC Start date:10/10/2024  POC End date:2024    Discussed with: Patient and     Subjective    Kerrie Hoffman is a 59 y.o. year old female who presents to IE for discussion/consult of lymphedema. 7 months ago was dx with right side breast cancer. Had 12 lymphnodes removed on 2024.  She notes stiffness with shoulder movement and describes \"tension\" along pectoral and upper arm.     Does have hx of CHF that is controlled with meds. Denies hx of DVT. Had acute kidney injury with medication a few years ago but this has passed and no other kidney issues noted.    She is accompanied by . She is not currently working. Was injured at work, with notable neck and back injuries    Kerrie presents to re-eval for post operative measurements after to lymph node dissection.    Diagnosed in: 2024    Home setting: Single family dwelling  Support:   Goals: Prevent post op swelling    Objective " - Fredis Fowler, PTA, CLT    Lymphedema treatment not indicated at this time.     UPPER EXTREMITY RIGHT CALCULATIONS      Flowsheet Row Most Recent Value   Volume UE (mL) 4376   Difference from last visit (mL)  -174   Difference from first visit (mL)  -174   Difference from last visit (%)  -4   Difference from first visit (%)  -4          Shoulder ROM shoulder abduction 160*. Full all other planes. Tenderness to palpation axillary region. No axillary banding noted.       Physical assessment: R UE appears equal to L UE  Palpation:no firmness or swelling noted.   Skin Mobility: good  Skin color: normal  Pittin  Wound presence: no wounds present  Wound size: n/a  Wound color: n/a  Stemmer's Sign: -     Transfer status: I              Precautions: CHF (controlled with meds)    Daily Treatment Diary:      Initial Evaluation Date:   POC Expiration: na  Compliance 8/26/24  10/10                   Visit Number 1 2                   Re-Eval  IE                 MC   Foto Captured Y                            Manuals 8/26 10/10           measurements sj sj           education sj sj           Self care sj SJ           MLD             Neuro Re-Ed                                                                                                        Ther Ex                                       HEP review  See below                                                                            Ther Activity                                       Gait Training                                       Modalities                                          PT reviewed with patient lymphedema education of skin care including: keeping extremity clean and dry, applying moisturizer daily, special attention to nail care, wearing sunscreen avoiding nicks and skin irritation from razors, wearing gloves with activities that may cause skin injury.   Also reviewed for patient to avoid excessive constriction such as tight clothing and jewelry,  extreme temperature changes, and the importance of compliance for bandaging and garments.   Patient also advised to contact physician if experiencing any constitutional symptoms, swelling, redness, pain, rash, itching, or increased skin temperature.      Access Code: [email or web address]  URL: [email or web address]  Date: 10/10/2024  Prepared by: Fredis Fowler    Exercises  - Sidelying Open Book  - 1 x daily - 7 x weekly - 1 sets - 10 reps - 10 seconds hold  - Supine Shoulder Flexion Extension AAROM with Dowel  - 1 x daily - 7 x weekly - 3 sets - 10 reps  - Scaption Wall Slide with Towel  - 1 x daily - 7 x weekly - 2 sets - 10 reps - 5 seconds hold  - Doorway Pec Stretch at 60 Elevation  - 1 x daily - 7 x weekly - 1 sets - 4 reps - 30 seconds hold

## 2024-10-14 ENCOUNTER — APPOINTMENT (OUTPATIENT)
Dept: PHYSICAL THERAPY | Facility: CLINIC | Age: 59
End: 2024-10-14
Payer: MEDICARE

## 2024-11-20 ENCOUNTER — EVALUATION (OUTPATIENT)
Age: 59
End: 2024-11-20
Payer: MEDICARE

## 2024-11-20 DIAGNOSIS — I89.0 LYMPHEDEMA: ICD-10-CM

## 2024-11-20 DIAGNOSIS — I89.0 LYMPHEDEMA OF BREAST: Primary | ICD-10-CM

## 2024-11-20 PROCEDURE — 97140 MANUAL THERAPY 1/> REGIONS: CPT

## 2024-11-20 PROCEDURE — 97164 PT RE-EVAL EST PLAN CARE: CPT | Performed by: PHYSICAL THERAPIST

## 2024-11-20 NOTE — PROGRESS NOTES
PT Re-Evaluation       Today's date: 2024  Patient name: Kerrie Hoffman  : 1965  MRN: [id number]  Referring provider: Celi Chong,*  Dx:   Encounter Diagnosis     ICD-10-CM    1. Lymphedema  I89.0             Plan  Patient would benefit from: Initiation of full decongestive therapy to include MLD, compression therapy and therapeutic exercises.   Planned therapy interventions: Education, MLD, Compression, ROM, Therapeutic exercises and manual therapy.       Patient presents for post chemo measurement and consult for lymphedema. She had axillary nodes at R breast removed.  Swelling began after second cycle of chemotherapy. She would like to initiate PT for lymphedema as quickly as possible. Measured for compression sleeve and glove today. She will [facility] with [facility] for compressive bra.     STG  - Patient and/or caregiver will understand lymphedema precautions to decrease risk of infection and exacerbation of lymphedema  - Patient will develop a tolerance for wearing multi-layer, short stretch bandages betweens sessions to facilitate limb decongestion  - Patient will experience decrease in pitting edema which will improve tissue health and decrease risk of infection.   - Patient will perform HEP independently or with minimal assistance to help improve lymphatic flow and venous return    LTG    - Patient and/or caregiver will be independent with donning and doffing of compression garments which will enable regular daily garment wear at time of discharge, skin maintenance, and self- MLD   - Patient and/or caregiver will be independent with HEP and lymphedema management to prevent relapse and reduce risk of infection and hospitalizations at time of discharge      Frequency (visits/wk): 1x week   Duration (weeks):12 weeks  POC Start date:2024  POC End date:2025    Discussed with: Patient and  "    Subjective    Kerrie Hoffman is a 59 y.o. year old female who presents to IE for discussion/consult of lymphedema. 7 months ago was dx with right side breast cancer. Had 12 lymphnodes removed on 2024.  She notes stiffness with shoulder movement and describes \"tension\" along pectoral and upper arm.     Does have hx of CHF that is controlled with meds. Denies hx of DVT. Had acute kidney injury with medication a few years ago but this has passed and no other kidney issues noted.    She is accompanied by . She is not currently working. Was injured at work, with notable neck and back injuries    Kerrie presents to re-eval for measurements after to lymph node dissection and 2 cycles of chemotherapy.    Diagnosed in: 2024    Home setting: Single family dwelling  Support:   Goals: Resolve post op swelling    Objective - Fredis Fowler, PTA, CLT      UPPER EXTREMITY RIGHT CALCULATIONS      Flowsheet Row Most Recent Value   Volume UE (mL) 5296   Difference from last visit (mL)  -5296   Difference from first visit (mL)  -5296          Total of 920 mL increase since last measure. CDT protocol indicated.     Shoulder ROM shoulder abduction 160*. Full all other planes. Tenderness to palpation axillary region. No axillary banding noted.       Physical assessment: R UE appears equal to L UE  Palpation:no firmness or swelling noted.   Skin Mobility: good  Skin color: normal  Pittin  Wound presence: no wounds present  Wound size: n/a  Wound color: n/a  Stemmer's Sign: -     Transfer status: I              Precautions: CHF (controlled with meds)    Daily Treatment Diary:      Initial Evaluation Date:   POC Expiration: na  Compliance 8/26/24  10/10                   Visit Number 1 2                   Re-Eval  IE                 MC   Foto Captured Y                            Manuals 8/26 10/10 11/20          measurements sj sj SJ          education sj sj SJ          Self care sj SJ           MLD       "       Neuro Re-Ed                                                                                                        Ther Ex                                       HEP review  See below                                                                            Ther Activity                                       Gait Training                                       Modalities                                          PT reviewed with patient lymphedema education of skin care including: keeping extremity clean and dry, applying moisturizer daily, special attention to nail care, wearing sunscreen avoiding nicks and skin irritation from razors, wearing gloves with activities that may cause skin injury.   Also reviewed for patient to avoid excessive constriction such as tight clothing and jewelry, extreme temperature changes, and the importance of compliance for bandaging and garments.   Patient also advised to contact physician if experiencing any constitutional symptoms, swelling, redness, pain, rash, itching, or increased skin temperature.

## 2024-12-21 ENCOUNTER — APPOINTMENT (INPATIENT)
Dept: CT IMAGING | Facility: HOSPITAL | Age: 59
DRG: 871 | End: 2024-12-21
Payer: MEDICARE

## 2024-12-21 ENCOUNTER — APPOINTMENT (EMERGENCY)
Dept: RADIOLOGY | Facility: HOSPITAL | Age: 59
DRG: 871 | End: 2024-12-21
Payer: MEDICARE

## 2024-12-21 ENCOUNTER — HOSPITAL ENCOUNTER (INPATIENT)
Facility: HOSPITAL | Age: 59
LOS: 2 days | Discharge: LEFT BEFORE DISCHARGE | DRG: 871 | End: 2024-12-23
Attending: EMERGENCY MEDICINE | Admitting: INTERNAL MEDICINE
Payer: MEDICARE

## 2024-12-21 DIAGNOSIS — J06.9 UPPER RESPIRATORY INFECTION: ICD-10-CM

## 2024-12-21 DIAGNOSIS — E83.42 HYPOMAGNESEMIA: ICD-10-CM

## 2024-12-21 DIAGNOSIS — E87.6 HYPOKALEMIA: ICD-10-CM

## 2024-12-21 DIAGNOSIS — R50.81 NEUTROPENIC FEVER  (HCC): Primary | ICD-10-CM

## 2024-12-21 DIAGNOSIS — D70.9 NEUTROPENIC FEVER  (HCC): Primary | ICD-10-CM

## 2024-12-21 DIAGNOSIS — C50.919 BREAST CANCER (HCC): ICD-10-CM

## 2024-12-21 PROBLEM — J45.21 MILD INTERMITTENT ASTHMA WITH ACUTE EXACERBATION: Status: ACTIVE | Noted: 2021-04-30

## 2024-12-21 PROBLEM — D61.818 PANCYTOPENIA (HCC): Status: ACTIVE | Noted: 2024-12-21

## 2024-12-21 PROBLEM — J20.9 ACUTE BRONCHITIS: Status: ACTIVE | Noted: 2024-12-21

## 2024-12-21 PROBLEM — A41.9 SEPSIS (HCC): Status: ACTIVE | Noted: 2024-12-21

## 2024-12-21 PROBLEM — E11.9 TYPE 2 DIABETES MELLITUS (HCC): Status: ACTIVE | Noted: 2024-12-21

## 2024-12-21 LAB
ALBUMIN SERPL BCG-MCNC: 3.5 G/DL (ref 3.5–5)
ALP SERPL-CCNC: 59 U/L (ref 34–104)
ALT SERPL W P-5'-P-CCNC: 38 U/L (ref 7–52)
ANION GAP SERPL CALCULATED.3IONS-SCNC: 6 MMOL/L (ref 4–13)
APTT PPP: 29 SECONDS (ref 23–34)
AST SERPL W P-5'-P-CCNC: 36 U/L (ref 13–39)
BILIRUB SERPL-MCNC: 0.76 MG/DL (ref 0.2–1)
BUN SERPL-MCNC: 7 MG/DL (ref 5–25)
CALCIUM SERPL-MCNC: 8.5 MG/DL (ref 8.4–10.2)
CHLORIDE SERPL-SCNC: 103 MMOL/L (ref 96–108)
CO2 SERPL-SCNC: 29 MMOL/L (ref 21–32)
CREAT SERPL-MCNC: 0.66 MG/DL (ref 0.6–1.3)
ERYTHROCYTE [DISTWIDTH] IN BLOOD BY AUTOMATED COUNT: 18.1 % (ref 11.6–15.1)
FLUAV AG UPPER RESP QL IA.RAPID: NEGATIVE
FLUBV AG UPPER RESP QL IA.RAPID: NEGATIVE
GFR SERPL CREATININE-BSD FRML MDRD: 95 ML/MIN/1.73SQ M
GLUCOSE SERPL-MCNC: 111 MG/DL (ref 65–140)
GLUCOSE SERPL-MCNC: 129 MG/DL (ref 65–140)
HCT VFR BLD AUTO: 29.9 % (ref 34.8–46.1)
HGB BLD-MCNC: 9.5 G/DL (ref 11.5–15.4)
INR PPP: 0.99 (ref 0.85–1.19)
LACTATE SERPL-SCNC: 1.2 MMOL/L (ref 0.5–2)
MAGNESIUM SERPL-MCNC: 1.6 MG/DL (ref 1.9–2.7)
MCH RBC QN AUTO: 27.5 PG (ref 26.8–34.3)
MCHC RBC AUTO-ENTMCNC: 31.8 G/DL (ref 31.4–37.4)
MCV RBC AUTO: 87 FL (ref 82–98)
PLATELET # BLD AUTO: 141 THOUSANDS/UL (ref 149–390)
PMV BLD AUTO: 11.2 FL (ref 8.9–12.7)
POTASSIUM SERPL-SCNC: 3.1 MMOL/L (ref 3.5–5.3)
PROT SERPL-MCNC: 5.9 G/DL (ref 6.4–8.4)
PROTHROMBIN TIME: 13.5 SECONDS (ref 12.3–15)
RBC # BLD AUTO: 3.45 MILLION/UL (ref 3.81–5.12)
SARS-COV+SARS-COV-2 AG RESP QL IA.RAPID: NEGATIVE
SODIUM SERPL-SCNC: 138 MMOL/L (ref 135–147)
WBC # BLD AUTO: 0.74 THOUSAND/UL (ref 4.31–10.16)

## 2024-12-21 PROCEDURE — 87040 BLOOD CULTURE FOR BACTERIA: CPT | Performed by: EMERGENCY MEDICINE

## 2024-12-21 PROCEDURE — 85730 THROMBOPLASTIN TIME PARTIAL: CPT | Performed by: EMERGENCY MEDICINE

## 2024-12-21 PROCEDURE — 96374 THER/PROPH/DIAG INJ IV PUSH: CPT

## 2024-12-21 PROCEDURE — 96361 HYDRATE IV INFUSION ADD-ON: CPT

## 2024-12-21 PROCEDURE — 94760 N-INVAS EAR/PLS OXIMETRY 1: CPT

## 2024-12-21 PROCEDURE — 36415 COLL VENOUS BLD VENIPUNCTURE: CPT | Performed by: EMERGENCY MEDICINE

## 2024-12-21 PROCEDURE — 83735 ASSAY OF MAGNESIUM: CPT | Performed by: EMERGENCY MEDICINE

## 2024-12-21 PROCEDURE — 83605 ASSAY OF LACTIC ACID: CPT | Performed by: EMERGENCY MEDICINE

## 2024-12-21 PROCEDURE — 87811 SARS-COV-2 COVID19 W/OPTIC: CPT | Performed by: EMERGENCY MEDICINE

## 2024-12-21 PROCEDURE — 84145 PROCALCITONIN (PCT): CPT | Performed by: NURSE PRACTITIONER

## 2024-12-21 PROCEDURE — 85007 BL SMEAR W/DIFF WBC COUNT: CPT | Performed by: EMERGENCY MEDICINE

## 2024-12-21 PROCEDURE — 99284 EMERGENCY DEPT VISIT MOD MDM: CPT

## 2024-12-21 PROCEDURE — 85610 PROTHROMBIN TIME: CPT | Performed by: EMERGENCY MEDICINE

## 2024-12-21 PROCEDURE — 94640 AIRWAY INHALATION TREATMENT: CPT

## 2024-12-21 PROCEDURE — 94664 DEMO&/EVAL PT USE INHALER: CPT

## 2024-12-21 PROCEDURE — 71046 X-RAY EXAM CHEST 2 VIEWS: CPT

## 2024-12-21 PROCEDURE — 85027 COMPLETE CBC AUTOMATED: CPT | Performed by: EMERGENCY MEDICINE

## 2024-12-21 PROCEDURE — 99285 EMERGENCY DEPT VISIT HI MDM: CPT | Performed by: EMERGENCY MEDICINE

## 2024-12-21 PROCEDURE — 82948 REAGENT STRIP/BLOOD GLUCOSE: CPT

## 2024-12-21 PROCEDURE — 71260 CT THORAX DX C+: CPT

## 2024-12-21 PROCEDURE — 99223 1ST HOSP IP/OBS HIGH 75: CPT | Performed by: INTERNAL MEDICINE

## 2024-12-21 PROCEDURE — 80053 COMPREHEN METABOLIC PANEL: CPT | Performed by: EMERGENCY MEDICINE

## 2024-12-21 PROCEDURE — 87804 INFLUENZA ASSAY W/OPTIC: CPT | Performed by: EMERGENCY MEDICINE

## 2024-12-21 RX ORDER — CARVEDILOL 12.5 MG/1
12.5 TABLET ORAL 2 TIMES DAILY WITH MEALS
COMMUNITY

## 2024-12-21 RX ORDER — LEVALBUTEROL INHALATION SOLUTION 1.25 MG/3ML
1.25 SOLUTION RESPIRATORY (INHALATION)
Status: DISCONTINUED | OUTPATIENT
Start: 2024-12-21 | End: 2024-12-23 | Stop reason: HOSPADM

## 2024-12-21 RX ORDER — LISINOPRIL 20 MG/1
20 TABLET ORAL
Status: DISCONTINUED | OUTPATIENT
Start: 2024-12-21 | End: 2024-12-23 | Stop reason: HOSPADM

## 2024-12-21 RX ORDER — ONDANSETRON 2 MG/ML
4 INJECTION INTRAMUSCULAR; INTRAVENOUS EVERY 6 HOURS PRN
Status: DISCONTINUED | OUTPATIENT
Start: 2024-12-21 | End: 2024-12-23 | Stop reason: HOSPADM

## 2024-12-21 RX ORDER — MAGNESIUM SULFATE HEPTAHYDRATE 40 MG/ML
2 INJECTION, SOLUTION INTRAVENOUS ONCE
Status: COMPLETED | OUTPATIENT
Start: 2024-12-21 | End: 2024-12-22

## 2024-12-21 RX ORDER — SODIUM CHLORIDE 9 MG/ML
75 INJECTION, SOLUTION INTRAVENOUS CONTINUOUS
Status: DISCONTINUED | OUTPATIENT
Start: 2024-12-21 | End: 2024-12-23 | Stop reason: HOSPADM

## 2024-12-21 RX ORDER — ASPIRIN 81 MG/1
81 TABLET, CHEWABLE ORAL
Status: DISCONTINUED | OUTPATIENT
Start: 2024-12-21 | End: 2024-12-23 | Stop reason: HOSPADM

## 2024-12-21 RX ORDER — ACETAMINOPHEN 325 MG/1
650 TABLET ORAL ONCE
Status: COMPLETED | OUTPATIENT
Start: 2024-12-21 | End: 2024-12-21

## 2024-12-21 RX ORDER — ENOXAPARIN SODIUM 100 MG/ML
40 INJECTION SUBCUTANEOUS DAILY
Status: DISCONTINUED | OUTPATIENT
Start: 2024-12-22 | End: 2024-12-21

## 2024-12-21 RX ORDER — VANCOMYCIN HYDROCHLORIDE 1 G/200ML
1000 INJECTION, SOLUTION INTRAVENOUS EVERY 12 HOURS
Status: DISCONTINUED | OUTPATIENT
Start: 2024-12-22 | End: 2024-12-23

## 2024-12-21 RX ORDER — CARVEDILOL 12.5 MG/1
12.5 TABLET ORAL 2 TIMES DAILY WITH MEALS
Status: DISCONTINUED | OUTPATIENT
Start: 2024-12-21 | End: 2024-12-23 | Stop reason: HOSPADM

## 2024-12-21 RX ORDER — GUAIFENESIN/DEXTROMETHORPHAN 100-10MG/5
10 SYRUP ORAL ONCE
Status: COMPLETED | OUTPATIENT
Start: 2024-12-21 | End: 2024-12-21

## 2024-12-21 RX ORDER — PRAVASTATIN SODIUM 20 MG
20 TABLET ORAL
Status: DISCONTINUED | OUTPATIENT
Start: 2024-12-21 | End: 2024-12-23 | Stop reason: HOSPADM

## 2024-12-21 RX ORDER — NEBIVOLOL 10 MG/1
10 TABLET ORAL
Status: DISCONTINUED | OUTPATIENT
Start: 2024-12-21 | End: 2024-12-21

## 2024-12-21 RX ORDER — INSULIN LISPRO 100 [IU]/ML
1-5 INJECTION, SOLUTION INTRAVENOUS; SUBCUTANEOUS
Status: DISCONTINUED | OUTPATIENT
Start: 2024-12-21 | End: 2024-12-23 | Stop reason: HOSPADM

## 2024-12-21 RX ORDER — CYCLOBENZAPRINE HCL 10 MG
10 TABLET ORAL
Status: DISCONTINUED | OUTPATIENT
Start: 2024-12-21 | End: 2024-12-23 | Stop reason: HOSPADM

## 2024-12-21 RX ORDER — PANTOPRAZOLE SODIUM 40 MG/1
40 TABLET, DELAYED RELEASE ORAL DAILY
Status: DISCONTINUED | OUTPATIENT
Start: 2024-12-22 | End: 2024-12-23 | Stop reason: HOSPADM

## 2024-12-21 RX ORDER — ENOXAPARIN SODIUM 100 MG/ML
40 INJECTION SUBCUTANEOUS EVERY 12 HOURS SCHEDULED
Status: DISCONTINUED | OUTPATIENT
Start: 2024-12-21 | End: 2024-12-23 | Stop reason: HOSPADM

## 2024-12-21 RX ORDER — ACETAMINOPHEN 325 MG/1
650 TABLET ORAL EVERY 6 HOURS PRN
Status: DISCONTINUED | OUTPATIENT
Start: 2024-12-21 | End: 2024-12-23 | Stop reason: HOSPADM

## 2024-12-21 RX ORDER — IPRATROPIUM BROMIDE AND ALBUTEROL SULFATE 2.5; .5 MG/3ML; MG/3ML
3 SOLUTION RESPIRATORY (INHALATION) ONCE
Status: COMPLETED | OUTPATIENT
Start: 2024-12-21 | End: 2024-12-21

## 2024-12-21 RX ORDER — POTASSIUM CHLORIDE 1500 MG/1
40 TABLET, EXTENDED RELEASE ORAL ONCE
Status: COMPLETED | OUTPATIENT
Start: 2024-12-21 | End: 2024-12-21

## 2024-12-21 RX ORDER — CARVEDILOL 12.5 MG/1
12.5 TABLET ORAL 2 TIMES DAILY WITH MEALS
Status: DISCONTINUED | OUTPATIENT
Start: 2024-12-22 | End: 2024-12-21

## 2024-12-21 RX ORDER — BENZONATATE 100 MG/1
100 CAPSULE ORAL ONCE
Status: COMPLETED | OUTPATIENT
Start: 2024-12-21 | End: 2024-12-21

## 2024-12-21 RX ORDER — SODIUM CHLORIDE FOR INHALATION 0.9 %
3 VIAL, NEBULIZER (ML) INHALATION
Status: DISCONTINUED | OUTPATIENT
Start: 2024-12-21 | End: 2024-12-21

## 2024-12-21 RX ORDER — CEFEPIME HYDROCHLORIDE 2 G/50ML
2000 INJECTION, SOLUTION INTRAVENOUS EVERY 8 HOURS
Status: DISCONTINUED | OUTPATIENT
Start: 2024-12-21 | End: 2024-12-23 | Stop reason: HOSPADM

## 2024-12-21 RX ORDER — INSULIN LISPRO 100 [IU]/ML
1-6 INJECTION, SOLUTION INTRAVENOUS; SUBCUTANEOUS
Status: DISCONTINUED | OUTPATIENT
Start: 2024-12-22 | End: 2024-12-23 | Stop reason: HOSPADM

## 2024-12-21 RX ORDER — METHYLPREDNISOLONE SODIUM SUCCINATE 40 MG/ML
40 INJECTION, POWDER, LYOPHILIZED, FOR SOLUTION INTRAMUSCULAR; INTRAVENOUS EVERY 8 HOURS SCHEDULED
Status: DISCONTINUED | OUTPATIENT
Start: 2024-12-21 | End: 2024-12-23

## 2024-12-21 RX ORDER — BUDESONIDE 0.5 MG/2ML
0.5 INHALANT ORAL
Status: DISCONTINUED | OUTPATIENT
Start: 2024-12-21 | End: 2024-12-21

## 2024-12-21 RX ADMIN — SODIUM CHLORIDE 1000 ML: 0.9 INJECTION, SOLUTION INTRAVENOUS at 18:12

## 2024-12-21 RX ADMIN — LEVALBUTEROL HYDROCHLORIDE 1.25 MG: 1.25 SOLUTION RESPIRATORY (INHALATION) at 20:07

## 2024-12-21 RX ADMIN — PIPERACILLIN AND TAZOBACTAM 4.5 G: 4; .5 INJECTION, POWDER, FOR SOLUTION INTRAVENOUS at 18:11

## 2024-12-21 RX ADMIN — POTASSIUM CHLORIDE 40 MEQ: 1500 TABLET, EXTENDED RELEASE ORAL at 18:12

## 2024-12-21 RX ADMIN — ASPIRIN 81 MG 81 MG: 81 TABLET ORAL at 23:14

## 2024-12-21 RX ADMIN — ACETAMINOPHEN 325MG 650 MG: 325 TABLET ORAL at 17:10

## 2024-12-21 RX ADMIN — METHYLPREDNISOLONE SODIUM SUCCINATE 40 MG: 40 INJECTION, POWDER, FOR SOLUTION INTRAMUSCULAR; INTRAVENOUS at 23:14

## 2024-12-21 RX ADMIN — IPRATROPIUM BROMIDE AND ALBUTEROL SULFATE 3 ML: .5; 3 SOLUTION RESPIRATORY (INHALATION) at 17:15

## 2024-12-21 RX ADMIN — MAGNESIUM SULFATE HEPTAHYDRATE 2 G: 40 INJECTION, SOLUTION INTRAVENOUS at 21:58

## 2024-12-21 RX ADMIN — SODIUM CHLORIDE 1000 ML: 0.9 INJECTION, SOLUTION INTRAVENOUS at 17:09

## 2024-12-21 RX ADMIN — BENZONATATE 100 MG: 100 CAPSULE ORAL at 17:10

## 2024-12-21 RX ADMIN — ENOXAPARIN SODIUM 40 MG: 40 INJECTION SUBCUTANEOUS at 20:31

## 2024-12-21 RX ADMIN — PRAVASTATIN SODIUM 20 MG: 20 TABLET ORAL at 19:37

## 2024-12-21 RX ADMIN — SODIUM CHLORIDE 75 ML/HR: 0.9 INJECTION, SOLUTION INTRAVENOUS at 19:37

## 2024-12-21 RX ADMIN — IOHEXOL 100 ML: 350 INJECTION, SOLUTION INTRAVENOUS at 18:28

## 2024-12-21 RX ADMIN — CYCLOBENZAPRINE 10 MG: 10 TABLET, FILM COATED ORAL at 23:14

## 2024-12-21 RX ADMIN — LISINOPRIL 20 MG: 20 TABLET ORAL at 23:14

## 2024-12-21 RX ADMIN — CEFEPIME HYDROCHLORIDE 2000 MG: 2 INJECTION, SOLUTION INTRAVENOUS at 20:29

## 2024-12-21 RX ADMIN — IPRATROPIUM BROMIDE 0.5 MG: 0.5 SOLUTION RESPIRATORY (INHALATION) at 20:07

## 2024-12-21 RX ADMIN — GUAIFENESIN AND DEXTROMETHORPHAN 10 ML: 20; 200 SYRUP ORAL at 18:12

## 2024-12-21 NOTE — ASSESSMENT & PLAN NOTE
"Continue with serial glucose monitoring and sliding scale insulin coverage.  No results found for: \"HGBA1C\"    No results for input(s): \"POCGLU\" in the last 72 hours.    Blood Sugar Average: Last 72 hrs:      "

## 2024-12-21 NOTE — ASSESSMENT & PLAN NOTE
Patient reports having fever, cough, shortness of breath  Diminished breath sounds with wheezing on exam  Likely asthma exacerbation in the setting of underlying infection  Follow-up on CT chest  IV steroids  Scheduled DuoNebs  Continue to monitor respiratory status closely

## 2024-12-21 NOTE — ED PROVIDER NOTES
Time reflects when diagnosis was documented in both MDM as applicable and the Disposition within this note       Time User Action Codes Description Comment    12/21/2024  6:18 PM Mery Jones [D70.9,  R50.81] Neutropenic fever  (HCC)     12/21/2024  6:18 PM Mery Jones [J06.9] Upper respiratory infection     12/21/2024  6:18 PM Mery Jones [E83.42] Hypomagnesemia     12/21/2024  6:18 PM Mery Jones [E87.6] Hypokalemia           ED Disposition       ED Disposition   Admit    Condition   Stable    Date/Time   Sat Dec 21, 2024  6:18 PM    Comment   Case was discussed with Dr Bartholomew and the patient's admission status was agreed to be Admission Status: inpatient status to the service of Dr. Bartholomew .               Assessment & Plan       Medical Decision Making  This patient presents with symptoms suspicious for likely pneumonia versus other upper respiratory infection.  Patient currently undergoing chemotherapy, presents with fever, labs with pancytopenia, manual differential pending at time of admission to medicine service for neutropenic fever.  Patient given IV fluids and antibiotics, also noted to have slight hypomagnesemia and hypokalemia which were replaced in the ED as well.  Based on history and physical doubt sinusitis.  Nasal swab was sent for COVID and influenza testing which is negative.  Do not suspect any underlying cardiopulmonary process.   Patient discussed with hospitalist service who will accept for admission    Problems Addressed:  Hypokalemia: acute illness or injury  Hypomagnesemia: acute illness or injury  Neutropenic fever  (HCC): acute illness or injury  Upper respiratory infection: acute illness or injury    Amount and/or Complexity of Data Reviewed  Labs: ordered. Decision-making details documented in ED Course.  Radiology: ordered and independent interpretation performed. Decision-making details documented in ED Course.    Risk  OTC drugs.  Prescription drug  management.  Decision regarding hospitalization.        ED Course as of 12/21/24 1954   Sat Dec 21, 2024   1805 Comprehensive metabolic panel(!)   1805 Lactic acid, plasma (w/reflex if result > 2.0)   1805 FLU/COVID Rapid Antigen (30 min. TAT) - Preferred screening test in ED   1805 CBC and differential(!)   1805 Magnesium(!)   1805 APTT   1805 Protime-INR   1805 XR chest 2 views       Medications   magnesium sulfate 2 g/50 mL IVPB (premix) 2 g (has no administration in time range)   acetaminophen (TYLENOL) tablet 650 mg (650 mg Oral Given 12/21/24 1710)   sodium chloride 0.9 % bolus 1,000 mL (0 mL Intravenous Stopped 12/21/24 1815)   benzonatate (TESSALON PERLES) capsule 100 mg (100 mg Oral Given 12/21/24 1710)   ipratropium-albuterol (DUO-NEB) 0.5-2.5 mg/3 mL inhalation solution 3 mL (3 mL Nebulization Given 12/21/24 1715)   piperacillin-tazobactam (ZOSYN) 4.5 g in sodium chloride 0.9 % 100 mL IVPB (4.5 g Intravenous New Bag 12/21/24 1811)   potassium chloride (Klor-Con M20) CR tablet 40 mEq (40 mEq Oral Given 12/21/24 1812)   sodium chloride 0.9 % bolus 1,000 mL (1,000 mL Intravenous New Bag 12/21/24 1812)   dextromethorphan-guaiFENesin (ROBITUSSIN DM) oral syrup 10 mL (10 mL Oral Given 12/21/24 1812)   iohexol (OMNIPAQUE) 350 MG/ML injection (MULTI-DOSE) 100 mL (100 mL Intravenous Given 12/21/24 1828)       ED Risk Strat Scores                          SBIRT 20yo+      Flowsheet Row Most Recent Value   Initial Alcohol Screen: US AUDIT-C     1. How often do you have a drink containing alcohol? 0 Filed at: 12/21/2024 1650   2. How many drinks containing alcohol do you have on a typical day you are drinking?  0 Filed at: 12/21/2024 1650   3b. FEMALE Any Age, or MALE 65+: How often do you have 4 or more drinks on one occassion? 0 Filed at: 12/21/2024 1650   Audit-C Score 0 Filed at: 12/21/2024 1650   JOSE MARIA: How many times in the past year have you...    Used an illegal drug or used a prescription medication for  non-medical reasons? Never Filed at: 12/21/2024 1524                            History of Present Illness       Chief Complaint   Patient presents with    Flu Symptoms     Patient presents to the ED with complaints of flu symptoms that began on Wednesday. The patient reports productive cough, fever, and body aches.        Past Medical History:   Diagnosis Date    Asthma     CHF (congestive heart failure) (HCC)     Diabetes mellitus (HCC)     Fibromyalgia     Migraine       Past Surgical History:   Procedure Laterality Date    BACK SURGERY      HYSTERECTOMY      NECK SURGERY        History reviewed. No pertinent family history.   Social History     Tobacco Use    Smoking status: Never    Smokeless tobacco: Never   Vaping Use    Vaping status: Never Used   Substance Use Topics    Alcohol use: Yes    Drug use: Never      E-Cigarette/Vaping    E-Cigarette Use Never User       E-Cigarette/Vaping Substances      I have reviewed and agree with the history as documented.     Patient is a 59-year-old female presenting to the emergency department complaining of fever, fatigue, chills, body aches, cough, congestion, symptoms present for the past 2 to 3 days, patient currently undergoing chemotherapy treatments for breast cancer, denies any sick contacts, having occasional diarrhea, no vomiting, no dysuria or hematuria, taking Tylenol or Motrin at home for fever, also recently prescribed azithromycin, taking Mucinex as well        Review of Systems   Constitutional:  Positive for fatigue and fever.   HENT:  Positive for congestion.    Eyes: Negative.    Respiratory:  Positive for cough and shortness of breath.    Cardiovascular: Negative.    Gastrointestinal:  Positive for diarrhea.   Endocrine: Negative.    Genitourinary: Negative.    Musculoskeletal:  Positive for myalgias.   Skin: Negative.    Allergic/Immunologic: Negative.    Neurological: Negative.    Hematological: Negative.    Psychiatric/Behavioral: Negative.              Objective       ED Triage Vitals [12/21/24 1646]   Temperature Pulse Blood Pressure Respirations SpO2 Patient Position - Orthostatic VS   (!) 100.8 °F (38.2 °C) (!) 109 167/75 18 97 % Sitting      Temp Source Heart Rate Source BP Location FiO2 (%) Pain Score    Temporal Monitor Left arm -- 4      Vitals      Date and Time Temp Pulse SpO2 Resp BP Pain Score FACES Pain Rating User   12/21/24 1914 97.9 °F (36.6 °C) 98 96 % -- 138/70 -- -- DII   12/21/24 1815 -- 103 97 % 20 173/79 -- -- AS   12/21/24 1710 -- -- -- -- -- Med Not Given for Pain - for MAR use only -- AS   12/21/24 1646 100.8 °F (38.2 °C) 109 97 % 18 167/75 4 -- RR            Physical Exam  Constitutional:       Appearance: She is well-developed. She is ill-appearing.   HENT:      Head: Normocephalic and atraumatic.      Nose: Congestion present.   Eyes:      Conjunctiva/sclera: Conjunctivae normal.      Pupils: Pupils are equal, round, and reactive to light.   Cardiovascular:      Rate and Rhythm: Tachycardia present.   Pulmonary:      Effort: Pulmonary effort is normal.      Breath sounds: Wheezing and rhonchi present.   Abdominal:      Palpations: Abdomen is soft.   Musculoskeletal:         General: Normal range of motion.      Cervical back: Normal range of motion and neck supple.   Skin:     General: Skin is warm and dry.   Neurological:      Mental Status: She is alert and oriented to person, place, and time.         Results Reviewed       Procedure Component Value Units Date/Time    Lactic acid, plasma (w/reflex if result > 2.0) [[id number]]  (Normal) Collected: 12/21/24 1705    Lab Status: Final result Specimen: Blood from Arm, Left Updated: 12/21/24 1733     LACTIC ACID 1.2 mmol/L     Narrative:      Result may be elevated if tourniquet was used during collection.    Comprehensive metabolic panel [[id number]]  (Abnormal) Collected: 12/21/24 1705    Lab Status: Final result Specimen: Blood from Arm, Left Updated: 12/21/24 1733     Sodium 138  mmol/L      Potassium 3.1 mmol/L      Chloride 103 mmol/L      CO2 29 mmol/L      ANION GAP 6 mmol/L      BUN 7 mg/dL      Creatinine 0.66 mg/dL      Glucose 111 mg/dL      Calcium 8.5 mg/dL      AST 36 U/L      ALT 38 U/L      Alkaline Phosphatase 59 U/L      Total Protein 5.9 g/dL      Albumin 3.5 g/dL      Total Bilirubin 0.76 mg/dL      eGFR 95 ml/min/1.73sq m     Narrative:      National Kidney Disease Foundation guidelines for Chronic Kidney Disease (CKD):     Stage 1 with normal or high GFR (GFR > 90 mL/min/1.73 square meters)    Stage 2 Mild CKD (GFR = 60-89 mL/min/1.73 square meters)    Stage 3A Moderate CKD (GFR = 45-59 mL/min/1.73 square meters)    Stage 3B Moderate CKD (GFR = 30-44 mL/min/1.73 square meters)    Stage 4 Severe CKD (GFR = 15-29 mL/min/1.73 square meters)    Stage 5 End Stage CKD (GFR <15 mL/min/1.73 square meters)  Note: GFR calculation is accurate only with a steady state creatinine    Magnesium [[id number]]  (Abnormal) Collected: 12/21/24 1705    Lab Status: Final result Specimen: Blood from Arm, Left Updated: 12/21/24 1733     Magnesium 1.6 mg/dL     FLU/COVID Rapid Antigen (30 min. TAT) - Preferred screening test in ED [[id number]]  (Normal) Collected: 12/21/24 1705    Lab Status: Final result Specimen: Nares from Nose Updated: 12/21/24 1731     SARS COV Rapid Antigen Negative     Influenza A Rapid Antigen Negative     Influenza B Rapid Antigen Negative    Narrative:      This test has been performed using the Quidel Sarita 2 FLU+SARS Antigen test under the Emergency Use Authorization (EUA). This test has been validated by the  and verified by the performing laboratory. The Sarita uses lateral flow immunofluorescent sandwich assay to detect SARS-COV, Influenza A and Influenza B Antigen.     The Quidel Sarita 2 SARS Antigen test does not differentiate between SARS-CoV and SARS-CoV-2.     Negative results are presumptive and may be confirmed with a molecular assay, if necessary,  for patient management. Negative results do not rule out SARS-CoV-2 or influenza infection and should not be used as the sole basis for treatment or patient management decisions. A negative test result may occur if the level of antigen in a sample is below the limit of detection of this test.     Positive results are indicative of the presence of viral antigens, but do not rule out bacterial infection or co-infection with other viruses.     All test results should be used as an adjunct to clinical observations and other information available to the provider.    FOR PEDIATRIC PATIENTS - copy/paste COVID Guidelines URL to browser: [email or web address]    Protime-INR [[id number]]  (Normal) Collected: 12/21/24 1705    Lab Status: Final result Specimen: Blood from Arm, Left Updated: 12/21/24 1726     Protime 13.5 seconds      INR 0.99    Narrative:      INR Therapeutic Range    Indication                                             INR Range      Atrial Fibrillation                                               2.0-3.0  Hypercoagulable State                                    2.0.2.3  Left Ventricular Asist Device                            2.0-3.0  Mechanical Heart Valve                                  -    Aortic(with afib, MI, embolism, HF, LA enlargement,    and/or coagulopathy)                                     2.0-3.0 (2.5-3.5)     Mitral                                                             2.5-3.5  Prosthetic/Bioprosthetic Heart Valve               2.0-3.0  Venous thromboembolism (VTE: VT, PE        2.0-3.0    APTT [[id number]]  (Normal) Collected: 12/21/24 1705    Lab Status: Final result Specimen: Blood from Arm, Left Updated: 12/21/24 1726     PTT 29 seconds     CBC and differential [[id number]]  (Abnormal) Collected: 12/21/24 1705    Lab Status: Final result Specimen: Blood from Arm, Left Updated: 12/21/24 1721     WBC 0.74 Thousand/uL      RBC 3.45  Million/uL      Hemoglobin 9.5 g/dL      Hematocrit 29.9 %      MCV 87 fL      MCH 27.5 pg      MCHC 31.8 g/dL      RDW 18.1 %      MPV 11.2 fL      Platelets 141 Thousands/uL     Narrative:      This is an appended report.  These results have been appended to a previously verified report.    Manual Differential(PHLEBS Do Not Order) [[id number]] Collected: 12/21/24 1705    Lab Status: In process Specimen: Blood from Arm, Left Updated: 12/21/24 1719    Blood culture #1 [[id number]] Collected: 12/21/24 1713    Lab Status: In process Specimen: Blood from Hand, Left Updated: 12/21/24 1718    Blood culture #2 [[id number]] Collected: 12/21/24 1705    Lab Status: In process Specimen: Blood from Arm, Left Updated: 12/21/24 1718            CT chest with contrast   Final Interpretation by Primo Harris MD (12/21 1921)      1.  Trace pleural effusions. No acute infiltrate.      2.  High density throughout the esophagus. Correlation with recent ingestion recommended.      3.  Right-sided nephrolithiasis.      4.  Mild diffuse hepatic steatosis.               Workstation performed: [id number]         XR chest 2 views   ED Interpretation by Mery Jones DO (12/21 1805)   No acute findings          Procedures    ED Medication and Procedure Management   Prior to Admission Medications   Prescriptions Last Dose Informant Patient Reported? Taking?   HYDROCHLOROTHIAZIDE PO   Yes No   Sig: Take 37.5 mg by mouth 2 (two) times a day    albuterol (PROVENTIL HFA,VENTOLIN HFA) 90 mcg/act inhaler   Yes No   Sig: Inhale 2 puffs every 6 (six) hours as needed for wheezing   aspirin 81 mg chewable tablet   Yes No   Sig: Chew 81 mg daily at bedtime    budesonide-formoterol (SYMBICORT) 160-4.5 mcg/act inhaler   Yes No   Sig: Inhale 2 puffs 2 (two) times a day Rinse mouth after use.   cyclobenzaprine (FLEXERIL) 10 mg tablet   Yes No   Sig: Take 10 mg by mouth daily at bedtime    lisinopril (ZESTRIL) 20 mg tablet   Yes No   Sig: Take 20 mg by  mouth daily at bedtime    multivitamin-minerals (CENTRUM ADULTS) tablet   No No   Sig: Take 1 tablet by mouth daily   nebivolol (BYSTOLIC) 10 mg tablet   Yes No   Sig: Take 10 mg by mouth daily at bedtime    omeprazole (PriLOSEC) 40 MG capsule   Yes No   Sig: Take 40 mg by mouth daily   rizatriptan (MAXALT) 10 MG tablet   Yes No   Sig: Take 10 mg by mouth once as needed for migraine May repeat in 2 hours if needed   simvastatin (ZOCOR) 10 mg tablet   Yes No   Sig: Take 10 mg by mouth daily at bedtime      Facility-Administered Medications: None     Current Discharge Medication List        CONTINUE these medications which have NOT CHANGED    Details   albuterol (PROVENTIL HFA,VENTOLIN HFA) 90 mcg/act inhaler Inhale 2 puffs every 6 (six) hours as needed for wheezing    Comments: Substitution to a formulary equivalent within the same pharmaceutical class is authorized.      aspirin 81 mg chewable tablet Chew 81 mg daily at bedtime       budesonide-formoterol (SYMBICORT) 160-4.5 mcg/act inhaler Inhale 2 puffs 2 (two) times a day Rinse mouth after use.      cyclobenzaprine (FLEXERIL) 10 mg tablet Take 10 mg by mouth daily at bedtime       HYDROCHLOROTHIAZIDE PO Take 37.5 mg by mouth 2 (two) times a day       lisinopril (ZESTRIL) 20 mg tablet Take 20 mg by mouth daily at bedtime       multivitamin-minerals (CENTRUM ADULTS) tablet Take 1 tablet by mouth daily  Qty: 30 tablet, Refills: 0    Associated Diagnoses: Pneumonia due to COVID-19 virus      nebivolol (BYSTOLIC) 10 mg tablet Take 10 mg by mouth daily at bedtime       omeprazole (PriLOSEC) 40 MG capsule Take 40 mg by mouth daily      rizatriptan (MAXALT) 10 MG tablet Take 10 mg by mouth once as needed for migraine May repeat in 2 hours if needed      simvastatin (ZOCOR) 10 mg tablet Take 10 mg by mouth daily at bedtime           No discharge procedures on file.  ED SEPSIS DOCUMENTATION   Time reflects when diagnosis was documented in both MDM as applicable and the  Disposition within this note       Time User Action Codes Description Comment    12/21/2024  6:18 PM Mery Jones [D70.9,  R50.81] Neutropenic fever  (HCC)     12/21/2024  6:18 PM Mery Jones [J06.9] Upper respiratory infection     12/21/2024  6:18 PM Mery Jones [E83.42] Hypomagnesemia     12/21/2024  6:18 PM Mery Jones [E87.6] Hypokalemia                  Mery Jones DO  12/21/24 1954

## 2024-12-21 NOTE — H&P
"H&P - Hospitalist   Name: Kerrie Hoffman 59 y.o. female I MRN: [id number]  Unit/Bed#: ED 04 I Date of Admission: 12/21/2024   Date of Service: 12/21/2024 I Hospital Day: 0     Assessment & Plan  Neutropenic sepsis (HCC)  Present admission as evidenced by tachycardia, fever, leukopenia  Likely secondary to bronchitis however cannot rule out underlying pneumonia  Will initiate treatment with broad-spectrum IV antibiotics  IV fluid resuscitation per protocol  Follow-up on blood and sputum culture results  Mild intermittent asthma with acute exacerbation  Patient reports having fever, cough, shortness of breath  Diminished breath sounds with wheezing on exam  Likely asthma exacerbation in the setting of underlying infection  Follow-up on CT chest  IV steroids  Scheduled DuoNebs  Continue to monitor respiratory status closely  Pancytopenia (HCC)  Secondary to chemotherapy.  Continue to follow CBC with differential closely  Acute bronchitis  Likely viral however cannot rule out underlying bacterial infection in the setting of neutropenic sepsis  Continue with broad-spectrum IV antibiotics  Follow-up on nasal MRSA  Follow-up on sputum cultures  Continue with IV vancomycin and cefepime  IV steroids and nebulization treatment  Antitussive therapy  Follow-up on CT chest  Breast cancer (HCC)  Currently undergoing chemotherapy with last session on 12/17  Close outpatient oncology follow-up  Hypokalemia  Replace.  Follow-up BMP in a.m.  Hypomagnesemia  Replace.  Type 2 diabetes mellitus (HCC)  Continue with serial glucose monitoring and sliding scale insulin coverage.  No results found for: \"HGBA1C\"    No results for input(s): \"POCGLU\" in the last 72 hours.    Blood Sugar Average: Last 72 hrs:          VTE Pharmacologic Prophylaxis:   High Risk (Score >/= 5) - Pharmacological DVT Prophylaxis Ordered: enoxaparin (Lovenox). Sequential Compression Devices Ordered.  Code Status: Prior full code  Discussion with family: Updated contact " person () at bedside.    Anticipated Length of Stay: Patient will be admitted on an inpatient basis with an anticipated length of stay of greater than 2 midnights secondary to management as mentioned above.    History of Present Illness   Chief Complaint: Shortness of breath and cough    Kerrie Hoffman is a 59 y.o. female with a PMH of breast cancer currently on chemotherapy, type 2 diabetes, asthma, fibromyalgia who presents with 3 to 4 days history of shortness of breath, cough, fever, chills, body ache, congestion.  Patient had her last chemotherapy treatment on 1217 and since then has been feeling weak with fever, chills up to 100.3, shortness of breath with cough productive of yellowish phlegm.  Denied any sick contacts or recent hospitalization.  Complained of nausea but denied any fever.  Has been alternating Tylenol with ibuprofen.  Also has ongoing wheezing.  Denies any recent asthma exacerbation..    Review of Systems   Constitutional:  Positive for activity change, appetite change, chills and fever.   HENT:  Positive for congestion.    Respiratory:  Positive for cough and shortness of breath.    Cardiovascular: Negative.    Gastrointestinal: Negative.    Endocrine: Negative.    Genitourinary: Negative.    Musculoskeletal: Negative.    Neurological: Negative.    Hematological: Negative.    Psychiatric/Behavioral: Negative.         Historical Information   Past Medical History:   Diagnosis Date    Asthma     CHF (congestive heart failure) (HCC)     Diabetes mellitus (HCC)     Fibromyalgia     Migraine      Past Surgical History:   Procedure Laterality Date    BACK SURGERY      HYSTERECTOMY      NECK SURGERY       Social History     Tobacco Use    Smoking status: Never    Smokeless tobacco: Never   Vaping Use    Vaping status: Never Used   Substance and Sexual Activity    Alcohol use: Yes    Drug use: Never    Sexual activity: Not on file     E-Cigarette/Vaping    E-Cigarette Use Never User       E-Cigarette/Vaping Substances       Social History:  Marital Status: /Civil Union       Meds/Allergies   I have reviewed home medications with patient personally.  Prior to Admission medications    Medication Sig Start Date End Date Taking? Authorizing Provider   albuterol (PROVENTIL HFA,VENTOLIN HFA) 90 mcg/act inhaler Inhale 2 puffs every 6 (six) hours as needed for wheezing    Historical Provider, MD   aspirin 81 mg chewable tablet Chew 81 mg daily at bedtime     Historical Provider, MD   budesonide-formoterol (SYMBICORT) 160-4.5 mcg/act inhaler Inhale 2 puffs 2 (two) times a day Rinse mouth after use.    Historical Provider, MD   cyclobenzaprine (FLEXERIL) 10 mg tablet Take 10 mg by mouth daily at bedtime     Historical Provider, MD   HYDROCHLOROTHIAZIDE PO Take 37.5 mg by mouth 2 (two) times a day     Historical Provider, MD   lisinopril (ZESTRIL) 20 mg tablet Take 20 mg by mouth daily at bedtime     Historical Provider, MD   multivitamin-minerals (CENTRUM ADULTS) tablet Take 1 tablet by mouth daily 5/7/21 6/6/21  Lakia Rodriguez, MD   nebivolol (BYSTOLIC) 10 mg tablet Take 10 mg by mouth daily at bedtime     Historical Provider, MD   omeprazole (PriLOSEC) 40 MG capsule Take 40 mg by mouth daily    Historical Provider, MD   rizatriptan (MAXALT) 10 MG tablet Take 10 mg by mouth once as needed for migraine May repeat in 2 hours if needed    Historical Provider, MD   simvastatin (ZOCOR) 10 mg tablet Take 10 mg by mouth daily at bedtime    Historical Provider, MD     Allergies   Allergen Reactions    Hctz [Hydrochlorothiazide] Other (See Comments)     Only with [employer] brand causes fluid build up        Objective :  Temp:  [100.8 °F (38.2 °C)] 100.8 °F (38.2 °C)  HR:  [103-109] 103  BP: (167-173)/(75-79) 173/79  Resp:  [18-20] 20  SpO2:  [97 %] 97 %  O2 Device: None (Room air)    Physical Exam  Constitutional:       Appearance: She is obese. She is ill-appearing.   HENT:      Head:  "Normocephalic and atraumatic.      Mouth/Throat:      Mouth: Mucous membranes are moist.   Eyes:      Pupils: Pupils are equal, round, and reactive to light.   Cardiovascular:      Rate and Rhythm: Regular rhythm. Tachycardia present.   Pulmonary:      Comments: Tachypnea with increased work of breathing  Bilateral inspiratory and expiratory wheezing and diminished breath sounds all lung base.  Chest wall Port-A-Cath in place  Abdominal:      General: Bowel sounds are normal. There is no distension.      Palpations: Abdomen is soft.      Tenderness: There is no abdominal tenderness.   Musculoskeletal:      Cervical back: Neck supple.      Right lower leg: No edema.      Left lower leg: No edema.   Skin:     General: Skin is warm.   Neurological:      General: No focal deficit present.      Mental Status: She is alert and oriented to person, place, and time.   Psychiatric:         Mood and Affect: Mood normal.          Lines/Drains:            Lab Results: I have reviewed the following results:  Results from last 7 days   Lab Units 12/21/24  1705   WBC Thousand/uL 0.74*   HEMOGLOBIN g/dL 9.5*   HEMATOCRIT % 29.9*   PLATELETS Thousands/uL 141*     Results from last 7 days   Lab Units 12/21/24  1705   SODIUM mmol/L 138   POTASSIUM mmol/L 3.1*   CHLORIDE mmol/L 103   CO2 mmol/L 29   BUN mg/dL 7   CREATININE mg/dL 0.66   ANION GAP mmol/L 6   CALCIUM mg/dL 8.5   ALBUMIN g/dL 3.5   TOTAL BILIRUBIN mg/dL 0.76   ALK PHOS U/L 59   ALT U/L 38   AST U/L 36   GLUCOSE RANDOM mg/dL 111     Results from last 7 days   Lab Units 12/21/24  1705   INR  0.99         No results found for: \"HGBA1C\"  Results from last 7 days   Lab Units 12/21/24  1705   LACTIC ACID mmol/L 1.2       Imaging Results Review: I reviewed radiology reports from this admission including: chest xray.      Administrative Statements       ** Please Note: This note has been constructed using a voice recognition system. **    "

## 2024-12-21 NOTE — ASSESSMENT & PLAN NOTE
Present admission as evidenced by tachycardia, fever, leukopenia  Likely secondary to bronchitis however cannot rule out underlying pneumonia  Will initiate treatment with broad-spectrum IV antibiotics  IV fluid resuscitation per protocol  Follow-up on blood and sputum culture results

## 2024-12-21 NOTE — ASSESSMENT & PLAN NOTE
Likely viral however cannot rule out underlying bacterial infection in the setting of neutropenic sepsis  Continue with broad-spectrum IV antibiotics  Follow-up on nasal MRSA  Follow-up on sputum cultures  Continue with IV vancomycin and cefepime  IV steroids and nebulization treatment  Antitussive therapy  Follow-up on CT chest

## 2024-12-22 LAB
ANION GAP SERPL CALCULATED.3IONS-SCNC: 7 MMOL/L (ref 4–13)
B PARAP IS1001 DNA NPH QL NAA+NON-PROBE: NOT DETECTED
B PERT.PT PRMT NPH QL NAA+NON-PROBE: NOT DETECTED
BUN SERPL-MCNC: 6 MG/DL (ref 5–25)
C PNEUM DNA NPH QL NAA+NON-PROBE: NOT DETECTED
CALCIUM SERPL-MCNC: 8.3 MG/DL (ref 8.4–10.2)
CHLORIDE SERPL-SCNC: 106 MMOL/L (ref 96–108)
CO2 SERPL-SCNC: 25 MMOL/L (ref 21–32)
CREAT SERPL-MCNC: 0.67 MG/DL (ref 0.6–1.3)
ERYTHROCYTE [DISTWIDTH] IN BLOOD BY AUTOMATED COUNT: 18.4 % (ref 11.6–15.1)
EST. AVERAGE GLUCOSE BLD GHB EST-MCNC: 154 MG/DL
FLUAV RNA NPH QL NAA+NON-PROBE: NOT DETECTED
FLUBV RNA NPH QL NAA+NON-PROBE: NOT DETECTED
GFR SERPL CREATININE-BSD FRML MDRD: 95 ML/MIN/1.73SQ M
GLUCOSE SERPL-MCNC: 145 MG/DL (ref 65–140)
GLUCOSE SERPL-MCNC: 162 MG/DL (ref 65–140)
GLUCOSE SERPL-MCNC: 177 MG/DL (ref 65–140)
GLUCOSE SERPL-MCNC: 197 MG/DL (ref 65–140)
GLUCOSE SERPL-MCNC: 204 MG/DL (ref 65–140)
HADV DNA NPH QL NAA+NON-PROBE: NOT DETECTED
HBA1C MFR BLD: 7 %
HCOV 229E RNA NPH QL NAA+NON-PROBE: NOT DETECTED
HCOV HKU1 RNA NPH QL NAA+NON-PROBE: NOT DETECTED
HCOV NL63 RNA NPH QL NAA+NON-PROBE: NOT DETECTED
HCOV OC43 RNA NPH QL NAA+NON-PROBE: NOT DETECTED
HCT VFR BLD AUTO: 29.2 % (ref 34.8–46.1)
HGB BLD-MCNC: 9.7 G/DL (ref 11.5–15.4)
HMPV RNA NPH QL NAA+NON-PROBE: NOT DETECTED
HPIV1 RNA NPH QL NAA+NON-PROBE: DETECTED
HPIV2 RNA NPH QL NAA+NON-PROBE: NOT DETECTED
HPIV3 RNA NPH QL NAA+NON-PROBE: NOT DETECTED
HPIV4 RNA NPH QL NAA+NON-PROBE: NOT DETECTED
IMM GRANULOCYTES # BLD AUTO: 0.01 THOUSAND/UL (ref 0–0.2)
M PNEUMO DNA NPH QL NAA+NON-PROBE: NOT DETECTED
MCH RBC QN AUTO: 28.8 PG (ref 26.8–34.3)
MCHC RBC AUTO-ENTMCNC: 33.2 G/DL (ref 31.4–37.4)
MCV RBC AUTO: 87 FL (ref 82–98)
NRBC BLD AUTO-RTO: 0 /100 WBCS
PLATELET # BLD AUTO: 141 THOUSANDS/UL (ref 149–390)
PMV BLD AUTO: 10.9 FL (ref 8.9–12.7)
POTASSIUM SERPL-SCNC: 3.4 MMOL/L (ref 3.5–5.3)
PROCALCITONIN SERPL-MCNC: 0.1 NG/ML
PROCALCITONIN SERPL-MCNC: 0.11 NG/ML
RBC # BLD AUTO: 3.37 MILLION/UL (ref 3.81–5.12)
RSV RNA NPH QL NAA+NON-PROBE: NOT DETECTED
RV+EV RNA NPH QL NAA+NON-PROBE: NOT DETECTED
SARS-COV-2 RNA NPH QL NAA+NON-PROBE: NOT DETECTED
SODIUM SERPL-SCNC: 138 MMOL/L (ref 135–147)
WBC # BLD AUTO: 0.41 THOUSAND/UL (ref 4.31–10.16)

## 2024-12-22 PROCEDURE — 87081 CULTURE SCREEN ONLY: CPT | Performed by: INTERNAL MEDICINE

## 2024-12-22 PROCEDURE — 82948 REAGENT STRIP/BLOOD GLUCOSE: CPT

## 2024-12-22 PROCEDURE — 83036 HEMOGLOBIN GLYCOSYLATED A1C: CPT | Performed by: NURSE PRACTITIONER

## 2024-12-22 PROCEDURE — 84145 PROCALCITONIN (PCT): CPT | Performed by: INTERNAL MEDICINE

## 2024-12-22 PROCEDURE — 94640 AIRWAY INHALATION TREATMENT: CPT

## 2024-12-22 PROCEDURE — 85027 COMPLETE CBC AUTOMATED: CPT | Performed by: INTERNAL MEDICINE

## 2024-12-22 PROCEDURE — 80048 BASIC METABOLIC PNL TOTAL CA: CPT | Performed by: INTERNAL MEDICINE

## 2024-12-22 PROCEDURE — 0202U NFCT DS 22 TRGT SARS-COV-2: CPT | Performed by: INTERNAL MEDICINE

## 2024-12-22 PROCEDURE — 99232 SBSQ HOSP IP/OBS MODERATE 35: CPT | Performed by: INTERNAL MEDICINE

## 2024-12-22 PROCEDURE — 94760 N-INVAS EAR/PLS OXIMETRY 1: CPT

## 2024-12-22 RX ORDER — POTASSIUM CHLORIDE 1500 MG/1
40 TABLET, EXTENDED RELEASE ORAL ONCE
Status: COMPLETED | OUTPATIENT
Start: 2024-12-22 | End: 2024-12-22

## 2024-12-22 RX ADMIN — CARVEDILOL 12.5 MG: 12.5 TABLET, FILM COATED ORAL at 00:46

## 2024-12-22 RX ADMIN — POTASSIUM CHLORIDE 40 MEQ: 1500 TABLET, EXTENDED RELEASE ORAL at 09:13

## 2024-12-22 RX ADMIN — LEVALBUTEROL HYDROCHLORIDE 1.25 MG: 1.25 SOLUTION RESPIRATORY (INHALATION) at 19:27

## 2024-12-22 RX ADMIN — CARVEDILOL 12.5 MG: 12.5 TABLET, FILM COATED ORAL at 17:17

## 2024-12-22 RX ADMIN — VANCOMYCIN HYDROCHLORIDE 1000 MG: 1 INJECTION, SOLUTION INTRAVENOUS at 09:16

## 2024-12-22 RX ADMIN — CEFEPIME HYDROCHLORIDE 2000 MG: 2 INJECTION, SOLUTION INTRAVENOUS at 12:22

## 2024-12-22 RX ADMIN — LEVALBUTEROL HYDROCHLORIDE 1.25 MG: 1.25 SOLUTION RESPIRATORY (INHALATION) at 13:05

## 2024-12-22 RX ADMIN — ENOXAPARIN SODIUM 40 MG: 40 INJECTION SUBCUTANEOUS at 09:13

## 2024-12-22 RX ADMIN — INSULIN LISPRO 1 UNITS: 100 INJECTION, SOLUTION INTRAVENOUS; SUBCUTANEOUS at 12:22

## 2024-12-22 RX ADMIN — LEVALBUTEROL HYDROCHLORIDE 1.25 MG: 1.25 SOLUTION RESPIRATORY (INHALATION) at 08:59

## 2024-12-22 RX ADMIN — ASPIRIN 81 MG 81 MG: 81 TABLET ORAL at 21:24

## 2024-12-22 RX ADMIN — IPRATROPIUM BROMIDE 0.5 MG: 0.5 SOLUTION RESPIRATORY (INHALATION) at 19:27

## 2024-12-22 RX ADMIN — CEFEPIME HYDROCHLORIDE 2000 MG: 2 INJECTION, SOLUTION INTRAVENOUS at 05:34

## 2024-12-22 RX ADMIN — CARVEDILOL 12.5 MG: 12.5 TABLET, FILM COATED ORAL at 09:13

## 2024-12-22 RX ADMIN — LISINOPRIL 20 MG: 20 TABLET ORAL at 21:24

## 2024-12-22 RX ADMIN — INSULIN LISPRO 2 UNITS: 100 INJECTION, SOLUTION INTRAVENOUS; SUBCUTANEOUS at 09:22

## 2024-12-22 RX ADMIN — ENOXAPARIN SODIUM 40 MG: 40 INJECTION SUBCUTANEOUS at 20:27

## 2024-12-22 RX ADMIN — IPRATROPIUM BROMIDE 0.5 MG: 0.5 SOLUTION RESPIRATORY (INHALATION) at 08:59

## 2024-12-22 RX ADMIN — VANCOMYCIN HYDROCHLORIDE 1000 MG: 1 INJECTION, SOLUTION INTRAVENOUS at 21:48

## 2024-12-22 RX ADMIN — IPRATROPIUM BROMIDE 0.5 MG: 0.5 SOLUTION RESPIRATORY (INHALATION) at 13:05

## 2024-12-22 RX ADMIN — CYCLOBENZAPRINE 10 MG: 10 TABLET, FILM COATED ORAL at 21:24

## 2024-12-22 RX ADMIN — METHYLPREDNISOLONE SODIUM SUCCINATE 40 MG: 40 INJECTION, POWDER, FOR SOLUTION INTRAMUSCULAR; INTRAVENOUS at 21:22

## 2024-12-22 RX ADMIN — VANCOMYCIN HYDROCHLORIDE 2000 MG: 1 INJECTION, POWDER, LYOPHILIZED, FOR SOLUTION INTRAVENOUS at 00:46

## 2024-12-22 RX ADMIN — METHYLPREDNISOLONE SODIUM SUCCINATE 40 MG: 40 INJECTION, POWDER, FOR SOLUTION INTRAMUSCULAR; INTRAVENOUS at 14:52

## 2024-12-22 RX ADMIN — INSULIN LISPRO 1 UNITS: 100 INJECTION, SOLUTION INTRAVENOUS; SUBCUTANEOUS at 21:25

## 2024-12-22 RX ADMIN — FLUTICASONE FUROATE 1 PUFF: 100 POWDER RESPIRATORY (INHALATION) at 09:21

## 2024-12-22 RX ADMIN — PANTOPRAZOLE SODIUM 40 MG: 40 TABLET, DELAYED RELEASE ORAL at 09:13

## 2024-12-22 RX ADMIN — METHYLPREDNISOLONE SODIUM SUCCINATE 40 MG: 40 INJECTION, POWDER, FOR SOLUTION INTRAMUSCULAR; INTRAVENOUS at 05:34

## 2024-12-22 RX ADMIN — CEFEPIME HYDROCHLORIDE 2000 MG: 2 INJECTION, SOLUTION INTRAVENOUS at 20:28

## 2024-12-22 RX ADMIN — PRAVASTATIN SODIUM 20 MG: 20 TABLET ORAL at 17:17

## 2024-12-22 NOTE — PROGRESS NOTES
"Progress Note - Hospitalist   Name: Kerrie Hoffman 59 y.o. female I MRN: [id number]  Unit/Bed#: -01 I Date of Admission: 12/21/2024   Date of Service: 12/22/2024 I Hospital Day: 1    Assessment & Plan  Neutropenic sepsis (HCC)  Present admission as evidenced by tachycardia, fever, leukopenia  Likely secondary to bronchitis however cannot rule out underlying pneumonia  Will initiate treatment with broad-spectrum IV antibiotics  IV fluid resuscitation per protocol  Follow-up on blood and sputum culture results  Mild intermittent asthma with acute exacerbation  Patient reports having fever, cough, shortness of breath  Diminished breath sounds with wheezing on exam  Likely asthma exacerbation in the setting of underlying infection  CT chest shows no pneumonia.    Symptoms likely secondary to s bronchitis.    Continue with intravenous steroids and scheduled DuoNebs  Continue to monitor respiratory status closely  Pancytopenia (HCC)  Secondary to chemotherapy.  Continue to follow CBC with differential closely  Acute bronchitis  Likely viral however cannot rule out underlying bacterial infection in the setting of neutropenic sepsis  Continue with broad-spectrum IV antibiotics  Follow-up on nasal MRSA  Follow-up on sputum cultures  Continue with IV vancomycin and cefepime.  Likely can de-escalate antibiotics based on culture results.  IV steroids and nebulization treatment  Antitussive therapy  CT chest negative for pneumonia.  Breast cancer (HCC)  Currently undergoing chemotherapy with last session on 12/17  Close outpatient oncology follow-up  Hypokalemia  Replace.  Follow-up BMP in a.m.  Hypomagnesemia  Replace.  Type 2 diabetes mellitus (HCC)  Continue with serial glucose monitoring and sliding scale insulin coverage.  No results found for: \"HGBA1C\"    Recent Labs     12/21/24 2054 12/22/24  0732   POCGLU 129 197*       Blood Sugar Average: Last 72 hrs:  (P) 163      VTE Pharmacologic Prophylaxis: VTE Score: 7 High " Risk (Score >/= 5) - Pharmacological DVT Prophylaxis Ordered: enoxaparin (Lovenox). Sequential Compression Devices Ordered.    Mobility:   Basic Mobility Inpatient Raw Score: 23  JH-HLM Goal: 7: Walk 25 feet or more  JH-HLM Achieved: 7: Walk 25 feet or more  JH-HLM Goal achieved. Continue to encourage appropriate mobility.    Patient Centered Rounds: I performed bedside rounds with nursing staff today.   Discussions with Specialists or Other Care Team Provider: Discussed with nursing    Education and Discussions with Family / Patient: Updated  () via phone.    Current Length of Stay: 1 day(s)  Current Patient Status: Inpatient   Certification Statement: The patient will continue to require additional inpatient hospital stay due to shortness of breath Ongoing steroids and antibiotics and monitoring of blood counts  Discharge Plan: Anticipate discharge in 24-48 hrs to home.    Code Status: Level 1 - Full Code    Subjective   Patient seen and examined at bedside.  Denies any worsening shortness of breath than on admission.  Likely improved cough today.  Denies any chest pain.  Tmax 100.8.  Objective :  Temp:  [97.2 °F (36.2 °C)-100.8 °F (38.2 °C)] 97.2 °F (36.2 °C)  HR:  [] 87  BP: (138-173)/(70-79) 154/71  Resp:  [17-20] 17  SpO2:  [94 %-97 %] 97 %  O2 Device: None (Room air)    Body mass index is 46.44 kg/m².     Input and Output Summary (last 24 hours):     Intake/Output Summary (Last 24 hours) at 12/22/2024 1110  Last data filed at 12/22/2024 0551  Gross per 24 hour   Intake 1190 ml   Output 850 ml   Net 340 ml       Physical Exam  Constitutional:       Appearance: She is obese.   HENT:      Head: Normocephalic and atraumatic.      Mouth/Throat:      Mouth: Mucous membranes are moist.   Eyes:      Pupils: Pupils are equal, round, and reactive to light.   Cardiovascular:      Rate and Rhythm: Normal rate and regular rhythm.   Pulmonary:      Effort: Pulmonary effort is normal. No  respiratory distress.      Comments: Diminished breath sounds right lung field.  Decreased wheezing.  Abdominal:      General: Abdomen is flat. Bowel sounds are normal.      Palpations: Abdomen is soft.   Musculoskeletal:      Cervical back: Neck supple.      Right lower leg: No edema.      Left lower leg: No edema.   Skin:     General: Skin is warm.   Neurological:      General: No focal deficit present.      Mental Status: She is oriented to person, place, and time. Mental status is at baseline.           Lines/Drains:              Lab Results: I have reviewed the following results:   Results from last 7 days   Lab Units 12/22/24  0549   WBC Thousand/uL 0.41*   HEMOGLOBIN g/dL 9.7*   HEMATOCRIT % 29.2*   PLATELETS Thousands/uL 141*     Results from last 7 days   Lab Units 12/22/24  0549 12/21/24  1705   SODIUM mmol/L 138 138   POTASSIUM mmol/L 3.4* 3.1*   CHLORIDE mmol/L 106 103   CO2 mmol/L 25 29   BUN mg/dL 6 7   CREATININE mg/dL 0.67 0.66   ANION GAP mmol/L 7 6   CALCIUM mg/dL 8.3* 8.5   ALBUMIN g/dL  --  3.5   TOTAL BILIRUBIN mg/dL  --  0.76   ALK PHOS U/L  --  59   ALT U/L  --  38   AST U/L  --  36   GLUCOSE RANDOM mg/dL 204* 111     Results from last 7 days   Lab Units 12/21/24  1705   INR  0.99     Results from last 7 days   Lab Units 12/22/24  0732 12/21/24  2054   POC GLUCOSE mg/dl 197* 129         Results from last 7 days   Lab Units 12/22/24  0549 12/21/24  1705   LACTIC ACID mmol/L  --  1.2   PROCALCITONIN ng/ml 0.10 0.11       Recent Cultures (last 7 days):   Results from last 7 days   Lab Units 12/21/24  1713 12/21/24  1705   BLOOD CULTURE  Received in Microbiology Lab. Culture in Progress. Received in Microbiology Lab. Culture in Progress.             Last 24 Hours Medication List:     Current Facility-Administered Medications:     acetaminophen (TYLENOL) tablet 650 mg, Q6H PRN    aspirin chewable tablet 81 mg, HS    carvedilol (COREG) tablet 12.5 mg, BID With Meals    cefepime (MAXIPIME) IVPB  (premix in dextrose) 2,000 mg 50 mL, Q8H, Last Rate: 2,000 mg (12/22/24 0534)    cyclobenzaprine (FLEXERIL) tablet 10 mg, HS    enoxaparin (LOVENOX) subcutaneous injection 40 mg, Q12H NORMAN    fluticasone (ARNUITY ELLIPTA) 100 MCG/ACT inhaler 1 puff, Daily    insulin lispro (HumALOG/ADMELOG) 100 units/mL subcutaneous injection 1-5 Units, HS    insulin lispro (HumALOG/ADMELOG) 100 units/mL subcutaneous injection 1-6 Units, TID AC **AND** Fingerstick Glucose (POCT), TID AC    ipratropium (ATROVENT) 0.02 % inhalation solution 0.5 mg, TID    levalbuterol (XOPENEX) inhalation solution 1.25 mg, TID **AND** [DISCONTINUED] sodium chloride 0.9 % inhalation solution 3 mL, TID    lisinopril (ZESTRIL) tablet 20 mg, HS    methylPREDNISolone sodium succinate (Solu-MEDROL) injection 40 mg, Q8H NORMAN    ondansetron (ZOFRAN) injection 4 mg, Q6H PRN    pantoprazole (PROTONIX) EC tablet 40 mg, Daily    pravastatin (PRAVACHOL) tablet 20 mg, Daily With Dinner    sodium chloride 0.9 % infusion, Continuous, Last Rate: 75 mL/hr (12/21/24 1937)    vancomycin (VANCOCIN) IVPB (premix in dextrose) 1,000 mg 200 mL, Q12H, Last Rate: 1,000 mg (12/22/24 0916)    Administrative Statements   Today, Patient Was Seen By: Jacqui Bartholomew MD      **Please Note: This note may have been constructed using a voice recognition system.**

## 2024-12-22 NOTE — PROGRESS NOTES
2022    From the office of:   [facility] [address]  [address] [place] [id number]  Phone: [phone]  Fax: [phone]    In regards to Jodee Decker, :  2023    Subjective   Patient ID: Jodee is a 10 month old female.    Chief Complaint   Patient presents with   • Fever     Fever started Thursday morning. Mom noticed sensitivity to sound yesterday and pulling on ear. No vomiting.   • Ear Pain     Sensitive o       HPI: Fever x 2 days, Tm 101 rectal, 100.8 this morning ( rectal),  pulling on ears, fussy, eating slt less, drinking fluids, no emesis,  nl uo/stool, restless sleep, no day care, no known exposures, no signif. PMH, no med-s.       No past medical history on file.    MEDICATIONS:  Current Outpatient Medications   Medication Sig   • acetaminophen (TYLENOL) 160 MG/5ML suspension Take 5 mLs by mouth.   • nystatin (MYCOSTATIN) 545437 UNIT/GM cream      No current facility-administered medications for this visit.       ALLERGIES:  ALLERGIES:  No Known Allergies    PAST SURGICAL HISTORY:  No past surgical history on file.    FAMILY HISTORY:  No family history on file.    SOCIAL HISTORY:         Review of Systems   Constitutional: Positive for appetite change, crying and fever.   HENT: Negative.    Eyes: Negative.    Respiratory: Negative.    Cardiovascular: Negative.    Gastrointestinal: Negative.    Genitourinary: Negative.    Musculoskeletal: Negative.    Skin: Negative.    Allergic/Immunologic: Negative.    Neurological: Negative.    Hematological: Negative.        Visit Vitals  Pulse 148   Temp 98.8 °F (37.1 °C) (Temporal)   Wt 8.705 kg (19 lb 3.1 oz)   SpO2 98%       Physical Exam  Constitutional:       General: She is active.      Appearance: Normal appearance. She is well-developed.      Comments: Happy, vigorous   HENT:      Head: Normocephalic and atraumatic. Anterior fontanelle is flat.      Right Ear: Tympanic  Kerrie Hoffman is a 59 y.o. female who is currently ordered Vancomycin IV with management by the Pharmacy Consult service.  Relevant clinical data and objective / subjective history reviewed.  Vancomycin Assessment:  Indication and Goal AUC/Trough: Pneumonia (goal -600, trough >10)  Clinical Status:  New Start  Micro:   Cultures pending  Renal Function:  SCr: 0.66 mg/dL  CrCl: 116.4 mL/min  Renal replacement: Not on dialysis  Days of Therapy: 1  Current Dose: 2,000 mg IV load  Vancomycin Plan:  New Dosing: initiate 1,000 mg IV Q12H  Estimated AUC: 463 mcg*hr/mL  Estimated Trough: 13.4 mcg/mL  Next Level: 12/23/24 at 0600  Renal Function Monitoring: Daily BMP and UOP  Pharmacy will continue to follow closely for s/sx of nephrotoxicity, infusion reactions and appropriateness of therapy.  BMP and CBC will be ordered per protocol. We will continue to follow the patient’s culture results and clinical progress daily.    Bharti Luo, Pharmacist   membrane and ear canal normal.      Left Ear: Tympanic membrane and ear canal normal.      Nose: Nose normal.      Mouth/Throat:      Mouth: Mucous membranes are moist.      Pharynx: Oropharynx is clear.      Neck: Normal range of motion and neck supple.   Eyes:      Extraocular Movements: Extraocular movements intact.      Conjunctiva/sclera: Conjunctivae normal.   Cardiovascular:      Pulses: Normal pulses.      Heart sounds: Normal heart sounds.   Pulmonary:      Effort: Pulmonary effort is normal.      Breath sounds: Normal breath sounds.   Abdominal:      General: Bowel sounds are normal.      Palpations: Abdomen is soft.   Genitourinary:     General: Normal vulva.      Rectum: Normal.   Musculoskeletal:         General: Normal range of motion.   Skin:     Capillary Refill: Capillary refill takes less than 2 seconds.      Turgor: Normal.      Comments: No rash   Neurological:      General: No focal deficit present.      Mental Status: She is alert.      Primitive Reflexes: Suck normal.         ASSESSMENT/ PLAN: Fever, likely viral infection, possible roseola, discussed possible causes, comfort aimee., observation, f/up if fever > 2-3 days or sx worsen.       FOLLOW-UP:  prn  No follow-ups on file.     Plan of care and anticipatory guidance discussed with patient/parents at bedside.  Parents displayed good understanding of plan of care.    Bianka Us MD

## 2024-12-22 NOTE — PLAN OF CARE
Problem: Potential for Falls  Goal: Patient will remain free of falls  Description: INTERVENTIONS:  - Educate patient/family on patient safety including physical limitations  - Instruct patient to call for assistance with activity   - Consult OT/PT to assist with strengthening/mobility   - Keep Call bell within reach  - Keep bed low and locked with side rails adjusted as appropriate  - Keep care items and personal belongings within reach  - Initiate and maintain comfort rounds  - Make Fall Risk Sign visible to staff  - Apply yellow socks and bracelet for high fall risk patients  - Consider moving patient to room near nurses station  Outcome: Progressing     Problem: Nutrition/Hydration-ADULT  Goal: Nutrient/Hydration intake appropriate for improving, restoring or maintaining nutritional needs  Description: Monitor and assess patient's nutrition/hydration status for malnutrition. Collaborate with interdisciplinary team and initiate plan and interventions as ordered.  Monitor patient's weight and dietary intake as ordered or per policy. Utilize nutrition screening tool and intervene as necessary. Determine patient's food preferences and provide high-protein, high-caloric foods as appropriate.     INTERVENTIONS:  - Monitor oral intake, urinary output, labs, and treatment plans  - Assess nutrition and hydration status and recommend course of action  - Evaluate amount of meals eaten  - Assist patient with eating if necessary   - Allow adequate time for meals  - Recommend/ encourage appropriate diets, oral nutritional supplements, and vitamin/mineral supplements  - Order, calculate, and assess calorie counts as needed  - Recommend, monitor, and adjust tube feedings and TPN/PPN based on assessed needs  - Assess need for intravenous fluids  - Provide specific nutrition/hydration education as appropriate  - Include patient/family/caregiver in decisions related to nutrition  Outcome: Progressing     Problem: PAIN -  ADULT  Goal: Verbalizes/displays adequate comfort level or baseline comfort level  Description: Interventions:  - Encourage patient to monitor pain and request assistance  - Assess pain using appropriate pain scale  - Administer analgesics based on type and severity of pain and evaluate response  - Implement non-pharmacological measures as appropriate and evaluate response  - Consider cultural and social influences on pain and pain management  - Notify physician/advanced practitioner if interventions unsuccessful or patient reports new pain  Outcome: Progressing     Problem: INFECTION - ADULT  Goal: Absence or prevention of progression during hospitalization  Description: INTERVENTIONS:  - Assess and monitor for signs and symptoms of infection  - Monitor lab/diagnostic results  - Monitor all insertion sites, i.e. indwelling lines, tubes, and drains  - Monitor endotracheal if appropriate and nasal secretions for changes in amount and color  - [place] appropriate cooling/warming therapies per order  - Administer medications as ordered  - Instruct and encourage patient and family to use good hand hygiene technique  - Identify and instruct in appropriate isolation precautions for identified infection/condition  Outcome: Progressing  Goal: Absence of fever/infection during neutropenic period  Description: INTERVENTIONS:  - Monitor WBC    Outcome: Progressing     Problem: SAFETY ADULT  Goal: Patient will remain free of falls  Description: INTERVENTIONS:  - Educate patient/family on patient safety including physical limitations  - Instruct patient to call for assistance with activity   - Consult OT/PT to assist with strengthening/mobility   - Keep Call bell within reach  - Keep bed low and locked with side rails adjusted as appropriate  - Keep care items and personal belongings within reach  - Initiate and maintain comfort rounds  - Make Fall Risk Sign visible to staff  - Apply yellow socks and bracelet for high fall  risk patients  - Consider moving patient to room near nurses station  Outcome: Progressing  Goal: Maintain or return to baseline ADL function  Description: INTERVENTIONS:  -  Assess patient's ability to carry out ADLs; assess patient's baseline for ADL function and identify physical deficits which impact ability to perform ADLs (bathing, care of mouth/teeth, toileting, grooming, dressing, etc.)  - Assess/evaluate cause of self-care deficits   - Assess range of motion  - Assess patient's mobility; develop plan if impaired  - Assess patient's need for assistive devices and provide as appropriate  - Encourage maximum independence but intervene and supervise when necessary  - Involve family in performance of ADLs  - Assess for home care needs following discharge   - Consider OT consult to assist with ADL evaluation and planning for discharge  - Provide patient education as appropriate  Outcome: Progressing  Goal: Maintains/Returns to pre admission functional level  Description: INTERVENTIONS:  - Perform AM-PAC 6 Click Basic Mobility/ Daily Activity assessment daily.  - Set and communicate daily mobility goal to care team and patient/family/caregiver.   - Collaborate with rehabilitation services on mobility goals if consulted  - Out of bed for toileting  - Record patient progress and toleration of activity level   Outcome: Progressing     Problem: DISCHARGE PLANNING  Goal: Discharge to home or other facility with appropriate resources  Description: INTERVENTIONS:  - Identify barriers to discharge w/patient and caregiver  - Arrange for needed discharge resources and transportation as appropriate  - Identify discharge learning needs (meds, wound care, etc.)  - Arrange for interpretive services to assist at discharge as needed  - Refer to Case Management Department for coordinating discharge planning if the patient needs post-hospital services based on physician/advanced practitioner order or complex needs related to  functional status, cognitive ability, or social support system  Outcome: Progressing     Problem: Knowledge Deficit  Goal: Patient/family/caregiver demonstrates understanding of disease process, treatment plan, medications, and discharge instructions  Description: Complete learning assessment and assess knowledge base.  Interventions:  - Provide teaching at level of understanding  - Provide teaching via preferred learning methods  Outcome: Progressing

## 2024-12-22 NOTE — ASSESSMENT & PLAN NOTE
Patient reports having fever, cough, shortness of breath  Diminished breath sounds with wheezing on exam  Likely asthma exacerbation in the setting of underlying infection  CT chest shows no pneumonia.    Symptoms likely secondary to s bronchitis.    Continue with intravenous steroids and scheduled DuoNebs  Continue to monitor respiratory status closely
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
## 2024-12-22 NOTE — ASSESSMENT & PLAN NOTE
Likely viral however cannot rule out underlying bacterial infection in the setting of neutropenic sepsis  Continue with broad-spectrum IV antibiotics  Follow-up on nasal MRSA  Follow-up on sputum cultures  Continue with IV vancomycin and cefepime.  Likely can de-escalate antibiotics based on culture results.  IV steroids and nebulization treatment  Antitussive therapy  CT chest negative for pneumonia.

## 2024-12-22 NOTE — PROGRESS NOTES
Kerrie Hoffman is a 59 y.o. female who is currently ordered Vancomycin IV with management by the Pharmacy Consult service.  Relevant clinical data and objective / subjective history reviewed.  Vancomycin Assessment:  Indication and Goal AUC/Trough: Pneumonia (goal -600, trough >10)  Clinical Status: stable  Micro:     Renal Function:  SCr: 0.67 mg/dL  CrCl: 122.5 mL/min  Renal replacement: Not on dialysis  Days of Therapy: 2  Current Dose: 1000 mg IV every 12 hours  Vancomycin Plan:  New Dosing: continue regimen  Estimated AUC: 464 mcg*hr/mL  Estimated Trough: 13.5 mcg/mL  Next Level: 12/23/24 @ 0600  Renal Function Monitoring: Daily BMP and UOP  Pharmacy will continue to follow closely for s/sx of nephrotoxicity, infusion reactions and appropriateness of therapy.  BMP and CBC will be ordered per protocol. We will continue to follow the patient’s culture results and clinical progress daily.    Virgil Wood, Pharmacist

## 2024-12-22 NOTE — ASSESSMENT & PLAN NOTE
"Continue with serial glucose monitoring and sliding scale insulin coverage.  No results found for: \"HGBA1C\"    Recent Labs     12/21/24 2054 12/22/24  0732   POCGLU 129 197*       Blood Sugar Average: Last 72 hrs:  (P) 163    "

## 2024-12-23 VITALS
SYSTOLIC BLOOD PRESSURE: 172 MMHG | OXYGEN SATURATION: 98 % | HEART RATE: 73 BPM | HEIGHT: 66 IN | DIASTOLIC BLOOD PRESSURE: 83 MMHG | BODY MASS INDEX: 46.24 KG/M2 | WEIGHT: 287.7 LBS | RESPIRATION RATE: 18 BRPM | TEMPERATURE: 97.2 F

## 2024-12-23 LAB
ANISOCYTOSIS BLD QL SMEAR: PRESENT
BASOPHILS NFR MAR MANUAL: 2 % (ref 0–1)
ERYTHROCYTE [DISTWIDTH] IN BLOOD BY AUTOMATED COUNT: 17.7 % (ref 11.6–15.1)
GLUCOSE SERPL-MCNC: 146 MG/DL (ref 65–140)
GLUCOSE SERPL-MCNC: 159 MG/DL (ref 65–140)
HCT VFR BLD AUTO: 29.4 % (ref 34.8–46.1)
HGB BLD-MCNC: 9.3 G/DL (ref 11.5–15.4)
LYMPHOCYTES # BLD AUTO: 24 % (ref 14–44)
MCH RBC QN AUTO: 27.5 PG (ref 26.8–34.3)
MCHC RBC AUTO-ENTMCNC: 31.6 G/DL (ref 31.4–37.4)
MCV RBC AUTO: 87 FL (ref 82–98)
MONOCYTES NFR BLD: 3 % (ref 4–12)
MRSA NOSE QL CULT: NORMAL
NEUTS BAND NFR BLD MANUAL: 6 % (ref 0–8)
NEUTS SEG NFR BLD AUTO: 65 % (ref 43–75)
NRBC BLD AUTO-RTO: 0 /100 WBCS
PATHOLOGY REVIEW: YES
PLATELET # BLD AUTO: 172 THOUSANDS/UL (ref 149–390)
PLATELET BLD QL SMEAR: ABNORMAL
PMV BLD AUTO: 11.1 FL (ref 8.9–12.7)
POIKILOCYTOSIS BLD QL SMEAR: PRESENT
RBC # BLD AUTO: 3.38 MILLION/UL (ref 3.81–5.12)
RBC MORPH BLD: PRESENT
VANCOMYCIN SERPL-MCNC: 40.6 UG/ML (ref 10–20)
WBC # BLD AUTO: 0.19 THOUSAND/UL (ref 4.31–10.16)

## 2024-12-23 PROCEDURE — 82948 REAGENT STRIP/BLOOD GLUCOSE: CPT

## 2024-12-23 PROCEDURE — 85025 COMPLETE CBC W/AUTO DIFF WBC: CPT | Performed by: INTERNAL MEDICINE

## 2024-12-23 PROCEDURE — 85060 BLOOD SMEAR INTERPRETATION: CPT | Performed by: STUDENT IN AN ORGANIZED HEALTH CARE EDUCATION/TRAINING PROGRAM

## 2024-12-23 PROCEDURE — 80202 ASSAY OF VANCOMYCIN: CPT | Performed by: INTERNAL MEDICINE

## 2024-12-23 PROCEDURE — 94640 AIRWAY INHALATION TREATMENT: CPT

## 2024-12-23 PROCEDURE — 94760 N-INVAS EAR/PLS OXIMETRY 1: CPT

## 2024-12-23 PROCEDURE — 99239 HOSP IP/OBS DSCHRG MGMT >30: CPT | Performed by: INTERNAL MEDICINE

## 2024-12-23 RX ORDER — PREDNISONE 10 MG/1
40 TABLET ORAL DAILY
Qty: 16 TABLET | Refills: 0 | Status: SHIPPED | OUTPATIENT
Start: 2024-12-23 | End: 2024-12-27

## 2024-12-23 RX ORDER — METHYLPREDNISOLONE SODIUM SUCCINATE 40 MG/ML
40 INJECTION, POWDER, LYOPHILIZED, FOR SOLUTION INTRAMUSCULAR; INTRAVENOUS EVERY 12 HOURS SCHEDULED
Status: DISCONTINUED | OUTPATIENT
Start: 2024-12-23 | End: 2024-12-23 | Stop reason: HOSPADM

## 2024-12-23 RX ORDER — VANCOMYCIN HYDROCHLORIDE 750 MG/150ML
750 INJECTION, SOLUTION INTRAVENOUS EVERY 12 HOURS
Status: DISCONTINUED | OUTPATIENT
Start: 2024-12-23 | End: 2024-12-23 | Stop reason: HOSPADM

## 2024-12-23 RX ADMIN — LEVALBUTEROL HYDROCHLORIDE 1.25 MG: 1.25 SOLUTION RESPIRATORY (INHALATION) at 07:43

## 2024-12-23 RX ADMIN — CARVEDILOL 12.5 MG: 12.5 TABLET, FILM COATED ORAL at 09:38

## 2024-12-23 RX ADMIN — ENOXAPARIN SODIUM 40 MG: 40 INJECTION SUBCUTANEOUS at 09:39

## 2024-12-23 RX ADMIN — FILGRASTIM-AAFI 600 MCG: 300 INJECTION, SOLUTION SUBCUTANEOUS at 14:56

## 2024-12-23 RX ADMIN — FLUTICASONE FUROATE 1 PUFF: 100 POWDER RESPIRATORY (INHALATION) at 09:39

## 2024-12-23 RX ADMIN — VANCOMYCIN HYDROCHLORIDE 1000 MG: 1 INJECTION, SOLUTION INTRAVENOUS at 09:36

## 2024-12-23 RX ADMIN — PANTOPRAZOLE SODIUM 40 MG: 40 TABLET, DELAYED RELEASE ORAL at 09:38

## 2024-12-23 RX ADMIN — LEVALBUTEROL HYDROCHLORIDE 1.25 MG: 1.25 SOLUTION RESPIRATORY (INHALATION) at 13:56

## 2024-12-23 RX ADMIN — IPRATROPIUM BROMIDE 0.5 MG: 0.5 SOLUTION RESPIRATORY (INHALATION) at 07:43

## 2024-12-23 RX ADMIN — SODIUM CHLORIDE 75 ML/HR: 0.9 INJECTION, SOLUTION INTRAVENOUS at 03:18

## 2024-12-23 RX ADMIN — INSULIN LISPRO 1 UNITS: 100 INJECTION, SOLUTION INTRAVENOUS; SUBCUTANEOUS at 12:18

## 2024-12-23 RX ADMIN — METHYLPREDNISOLONE SODIUM SUCCINATE 40 MG: 40 INJECTION, POWDER, FOR SOLUTION INTRAMUSCULAR; INTRAVENOUS at 05:00

## 2024-12-23 RX ADMIN — CEFEPIME HYDROCHLORIDE 2000 MG: 2 INJECTION, SOLUTION INTRAVENOUS at 03:18

## 2024-12-23 RX ADMIN — IPRATROPIUM BROMIDE 0.5 MG: 0.5 SOLUTION RESPIRATORY (INHALATION) at 13:56

## 2024-12-23 NOTE — ASSESSMENT & PLAN NOTE
Patient reports having fever, cough, shortness of breath  Diminished breath sounds with wheezing on exam  Likely asthma exacerbation in the setting of underlying infection  CT chest shows no pneumonia.    Symptoms likely secondary to parainfluenza virus and asthmatic bronchitis.    Continue with intravenous steroids and scheduled DuoNebs.  Switch to prednisone to complete total 5 days of treatment  Continue to monitor respiratory status closely

## 2024-12-23 NOTE — ASSESSMENT & PLAN NOTE
Likely viral however cannot rule out underlying bacterial infection in the setting of neutropenic sepsis  Continue with broad-spectrum IV antibiotics  Follow-up on nasal MRSA--pending.  Follow-up on sputum cultures  Continue with IV vancomycin and cefepime.  Likely can de-escalate antibiotics based on culture results.  IV steroids and nebulization treatment  Antitussive therapy  CT chest negative for pneumonia.  RP 2 panel positive for parainfluenza virus.  Likely etiology of acute asthmatic bronchitis.  Continue with symptomatic management with antitussive therapy, scheduled nebulizer inhaler treatment.  Will complete 5 dose of steroids because of asthma flare.  Will empirically give antibiotics as patient is profoundly neutropenic.  Patient wants to leave AGAINST MEDICAL ADVICE.  Aware that she has pending blood culture results and ongoing severe neutropenia.  Risks of leaving the hospital with this profound neutropenia discussed with patient and  at length at bedside.  She will be getting 1 dose of filgrastim prior to discharge today.

## 2024-12-23 NOTE — ASSESSMENT & PLAN NOTE
Present admission as evidenced by tachycardia, fever, leukopenia  Likely secondary to bronchitis however cannot rule out underlying pneumonia  Will initiate treatment with broad-spectrum IV antibiotics  IV fluid resuscitation per protocol  Blood cultures have been negative for 24 hours.  RP 2 panel positive for parainfluenza virus  Switch to prednisone to complete 5 days of treatment.  Recommended ongoing hospitalization for close monitoring of blood counts and culture results  Patient however wants to leave AGAINST MEDICAL ADVICE  Discussed with patient's primary oncologist at [facility]  Recommended to give filgrastim x 1 dose  Patient will get short-term CBC upon discharge.

## 2024-12-23 NOTE — NURSING NOTE
Patient is alert and oriented. Discharge instructions given to patient and spouse verbalized understanding. PIV removed. Patient discharged with all personal belongings.

## 2024-12-23 NOTE — MALNUTRITION/BMI
This medical record reflects one or more clinical indicators suggestive of morbid obesity.    BMI Findings:  Adult BMI Classifications: Morbid Obesity 45-49.9        Body mass index is 46.44 kg/m².     See Nutrition note dated 12/23/24 for additional details.  Completed nutrition assessment is viewable in the nutrition documentation.

## 2024-12-23 NOTE — ASSESSMENT & PLAN NOTE
Secondary to chemotherapy.  Continue to follow CBC with differential closely  Has severe neutropenia with ANC less than 500.  Discussed with patient's primary oncology team at [facility].  Recommended patient to stay in the hospital until ANC above 500.  Recommended patient to have filgrastim administered today.  Patient not agreeable for inpatient hospitalization and wants to sign AGAINST MEDICAL ADVICE.  Discussed with patient risks of leaving AMA including worsening of counts, ongoing worsening of infection including death.  Patient expresses understanding and is agreeable to get close outpatient follow-up and blood work in 3 days time.  Discussed with patient's primary oncology team.  They will order blood work to be done in 3 days and will follow-up with the patient for further management of neutropenia.  Patient will have 1 dose of filgrastim at 5 mg/kg prior to discharge today.

## 2024-12-23 NOTE — PROGRESS NOTES
Kerrie Hoffman is a 59 y.o. female who is currently ordered Vancomycin IV with management by the Pharmacy Consult service.  Relevant clinical data and objective / subjective history reviewed.  Vancomycin Assessment:  Indication and Goal AUC/Trough: Pneumonia (goal -600, trough >10)  Clinical Status: stable  Micro:   pending  Renal Function:  SCr: 0.67 mg/dL 24  CrCl: 122.5 mL/min 24  Renal replacement: Not on dialysis  Days of Therapy: 3  Current Dose: 1000mg iv q12h  Vancomycin Plan:  New Dosinmg iv q12h  Estimated AUC: 450 mcg*hr/mL  Estimated Trough: 13.5 mcg/mL  Next Level: 24 0600  Renal Function Monitoring: Daily BMP and UOP  Pharmacy will continue to follow closely for s/sx of nephrotoxicity, infusion reactions and appropriateness of therapy.  BMP and CBC will be ordered per protocol. We will continue to follow the patient’s culture results and clinical progress daily.    Aamir Villa, Pharmacist

## 2024-12-23 NOTE — PLAN OF CARE
Problem: Potential for Falls  Goal: Patient will remain free of falls  Description: INTERVENTIONS:  - Educate patient/family on patient safety including physical limitations  - Instruct patient to call for assistance with activity   - Consult OT/PT to assist with strengthening/mobility   - Keep Call bell within reach  - Keep bed low and locked with side rails adjusted as appropriate  - Keep care items and personal belongings within reach  - Initiate and maintain comfort rounds  - Make Fall Risk Sign visible to staff  - Offer Toileting every  Hours, in advance of need  - Initiate/Maintain alarm  - Obtain necessary fall risk management equipment:   - Apply yellow socks and bracelet for high fall risk patients  - Consider moving patient to room near nurses station  Outcome: Progressing     Problem: Nutrition/Hydration-ADULT  Goal: Nutrient/Hydration intake appropriate for improving, restoring or maintaining nutritional needs  Description: Monitor and assess patient's nutrition/hydration status for malnutrition. Collaborate with interdisciplinary team and initiate plan and interventions as ordered.  Monitor patient's weight and dietary intake as ordered or per policy. Utilize nutrition screening tool and intervene as necessary. Determine patient's food preferences and provide high-protein, high-caloric foods as appropriate.     INTERVENTIONS:  - Monitor oral intake, urinary output, labs, and treatment plans  - Assess nutrition and hydration status and recommend course of action  - Evaluate amount of meals eaten  - Assist patient with eating if necessary   - Allow adequate time for meals  - Recommend/ encourage appropriate diets, oral nutritional supplements, and vitamin/mineral supplements  - Order, calculate, and assess calorie counts as needed  - Recommend, monitor, and adjust tube feedings and TPN/PPN based on assessed needs  - Assess need for intravenous fluids  - Provide specific nutrition/hydration education as  appropriate  - Include patient/family/caregiver in decisions related to nutrition  Outcome: Progressing     Problem: PAIN - ADULT  Goal: Verbalizes/displays adequate comfort level or baseline comfort level  Description: Interventions:  - Encourage patient to monitor pain and request assistance  - Assess pain using appropriate pain scale  - Administer analgesics based on type and severity of pain and evaluate response  - Implement non-pharmacological measures as appropriate and evaluate response  - Consider cultural and social influences on pain and pain management  - Notify physician/advanced practitioner if interventions unsuccessful or patient reports new pain  Outcome: Progressing     Problem: INFECTION - ADULT  Goal: Absence or prevention of progression during hospitalization  Description: INTERVENTIONS:  - Assess and monitor for signs and symptoms of infection  - Monitor lab/diagnostic results  - Monitor all insertion sites, i.e. indwelling lines, tubes, and drains  - Monitor endotracheal if appropriate and nasal secretions for changes in amount and color  - Jelm appropriate cooling/warming therapies per order  - Administer medications as ordered  - Instruct and encourage patient and family to use good hand hygiene technique  - Identify and instruct in appropriate isolation precautions for identified infection/condition  Outcome: Progressing  Goal: Absence of fever/infection during neutropenic period  Description: INTERVENTIONS:  - Monitor WBC    Outcome: Progressing     Problem: SAFETY ADULT  Goal: Patient will remain free of falls  Description: INTERVENTIONS:  - Educate patient/family on patient safety including physical limitations  - Instruct patient to call for assistance with activity   - Consult OT/PT to assist with strengthening/mobility   - Keep Call bell within reach  - Keep bed low and locked with side rails adjusted as appropriate  - Keep care items and personal belongings within reach  -  Initiate and maintain comfort rounds  - Make Fall Risk Sign visible to staff  - Offer Toileting every  Hours, in advance of need  - Initiate/Maintain alarm  - Obtain necessary fall risk management equipment:   - Apply yellow socks and bracelet for high fall risk patients  - Consider moving patient to room near nurses station  Outcome: Progressing  Goal: Maintain or return to baseline ADL function  Description: INTERVENTIONS:  -  Assess patient's ability to carry out ADLs; assess patient's baseline for ADL function and identify physical deficits which impact ability to perform ADLs (bathing, care of mouth/teeth, toileting, grooming, dressing, etc.)  - Assess/evaluate cause of self-care deficits   - Assess range of motion  - Assess patient's mobility; develop plan if impaired  - Assess patient's need for assistive devices and provide as appropriate  - Encourage maximum independence but intervene and supervise when necessary  - Involve family in performance of ADLs  - Assess for home care needs following discharge   - Consider OT consult to assist with ADL evaluation and planning for discharge  - Provide patient education as appropriate  Outcome: Progressing  Goal: Maintains/Returns to pre admission functional level  Description: INTERVENTIONS:  - Perform AM-PAC 6 Click Basic Mobility/ Daily Activity assessment daily.  - Set and communicate daily mobility goal to care team and patient/family/caregiver.   - Collaborate with rehabilitation services on mobility goals if consulted  - Perform Range of Motion  times a day.  - Reposition patient every  hours.  - Dangle patient  times a day  - Stand patient  times a day  - Ambulate patient  times a day  - Out of bed to chair  times a day   - Out of bed for meals  times a day  - Out of bed for toileting  - Record patient progress and toleration of activity level   Outcome: Progressing     Problem: DISCHARGE PLANNING  Goal: Discharge to home or other facility with appropriate  resources  Description: INTERVENTIONS:  - Identify barriers to discharge w/patient and caregiver  - Arrange for needed discharge resources and transportation as appropriate  - Identify discharge learning needs (meds, wound care, etc.)  - Arrange for interpretive services to assist at discharge as needed  - Refer to Case Management Department for coordinating discharge planning if the patient needs post-hospital services based on physician/advanced practitioner order or complex needs related to functional status, cognitive ability, or social support system  Outcome: Progressing     Problem: Knowledge Deficit  Goal: Patient/family/caregiver demonstrates understanding of disease process, treatment plan, medications, and discharge instructions  Description: Complete learning assessment and assess knowledge base.  Interventions:  - Provide teaching at level of understanding  - Provide teaching via preferred learning methods  Outcome: Progressing

## 2024-12-23 NOTE — ASSESSMENT & PLAN NOTE
Continue with serial glucose monitoring and sliding scale insulin coverage.    Lab Results   Component Value Date    HGBA1C 7.0 (H) 12/22/2024       Recent Labs     12/22/24  1634 12/22/24  2118 12/23/24  0756 12/23/24  1135   POCGLU 145* 162* 146* 159*       Blood Sugar Average: Last 72 hrs:  (P) [id number]

## 2024-12-23 NOTE — DISCHARGE SUMMARY
Discharge Summary - Hospitalist   Name: Kerrie Hoffman 59 y.o. female I MRN: [id number]  Unit/Bed#: -01 I Date of Admission: 12/21/2024   Date of Service: 12/23/2024 I Hospital Day: 2     Assessment & Plan  Neutropenic sepsis (HCC)  Present admission as evidenced by tachycardia, fever, leukopenia  Likely secondary to bronchitis however cannot rule out underlying pneumonia  Will initiate treatment with broad-spectrum IV antibiotics  IV fluid resuscitation per protocol  Blood cultures have been negative for 24 hours.  RP 2 panel positive for parainfluenza virus  Switch to prednisone to complete 5 days of treatment.  Recommended ongoing hospitalization for close monitoring of blood counts and culture results  Patient however wants to leave AGAINST MEDICAL ADVICE  Discussed with patient's primary oncologist at [facility]  Recommended to give filgrastim x 1 dose  Patient will get short-term CBC upon discharge.  Mild intermittent asthma with acute exacerbation  Patient reports having fever, cough, shortness of breath  Diminished breath sounds with wheezing on exam  Likely asthma exacerbation in the setting of underlying infection  CT chest shows no pneumonia.    Symptoms likely secondary to parainfluenza virus and asthmatic bronchitis.    Continue with intravenous steroids and scheduled DuoNebs.  Switch to prednisone to complete total 5 days of treatment  Continue to monitor respiratory status closely  Pancytopenia (HCC)  Secondary to chemotherapy.  Continue to follow CBC with differential closely  Has severe neutropenia with ANC less than 500.  Discussed with patient's primary oncology team at [facility].  Recommended patient to stay in the hospital until ANC above 500.  Recommended patient to have filgrastim administered today.  Patient not agreeable for inpatient hospitalization and wants to sign AGAINST MEDICAL ADVICE.  Discussed with patient risks of leaving AMA including worsening of  counts, ongoing worsening of infection including death.  Patient expresses understanding and is agreeable to get close outpatient follow-up and blood work in 3 days time.  Discussed with patient's primary oncology team.  They will order blood work to be done in 3 days and will follow-up with the patient for further management of neutropenia.  Patient will have 1 dose of filgrastim at 5 mg/kg prior to discharge today.  Acute bronchitis  Likely viral however cannot rule out underlying bacterial infection in the setting of neutropenic sepsis  Continue with broad-spectrum IV antibiotics  Follow-up on nasal MRSA--pending.  Follow-up on sputum cultures  Continue with IV vancomycin and cefepime.  Likely can de-escalate antibiotics based on culture results.  IV steroids and nebulization treatment  Antitussive therapy  CT chest negative for pneumonia.  RP 2 panel positive for parainfluenza virus.  Likely etiology of acute asthmatic bronchitis.  Continue with symptomatic management with antitussive therapy, scheduled nebulizer inhaler treatment.  Will complete 5 dose of steroids because of asthma flare.  Will empirically give antibiotics as patient is profoundly neutropenic.  Patient wants to leave AGAINST MEDICAL ADVICE.  Aware that she has pending blood culture results and ongoing severe neutropenia.  Risks of leaving the hospital with this profound neutropenia discussed with patient and  at length at bedside.  She will be getting 1 dose of filgrastim prior to discharge today.  Breast cancer (HCC)  Currently undergoing chemotherapy with last session on 12/17  Close outpatient oncology follow-up  Hypokalemia  Replace.  Follow-up BMP in a.m.  Hypomagnesemia  Replace.  Type 2 diabetes mellitus (HCC)  Continue with serial glucose monitoring and sliding scale insulin coverage.    Lab Results   Component Value Date    HGBA1C 7.0 (H) 12/22/2024       Recent Labs     12/22/24  1634 12/22/24  2118 12/23/24  1820  12/23/24  1135   POCGLU 145* 162* 146* 159*       Blood Sugar Average: Last 72 hrs:  (P) [id number]       Medical Problems       Resolved Problems  Date Reviewed: 10/10/2024   None         MESSAGE TO PCP (Jordy Mae DO) FOR FOLLOW UP:   Thank you for allowing us to participate in the care of your patient, Kerrie Hoffman, who was hospitalized from 12/21/2024 through 12/23/24 with the admitting diagnosis of neutropenic sepsis.  She had asthmatic bronchitis and acute exacerbation secondary to parainfluenza virus.  Patient had severe neutropenia and required 1 dose of filgrastim.  Recommended ongoing hospitalization for following up on blood culture results.  Patient however wanted to sign AGAINST MEDICAL ADVICE.  Monitoring of counts,  Medication Changes:  Discharged on prednisone to complete 5 days of treatment.  Azithromycin to complete 5 days of treatment  Outpatient testing recommended:  Recommended CBC with differential in 3 days for follow-up of white count  If you have any additional questions or would like to discuss further, please feel free to contact me.  Jacqui Bartholomew MD  [facility] Internal Medicine, Hospitalist, [phone]     Admission Date:   Admission Orders (From admission, onward)       Ordered        12/21/24 1818  INPATIENT ADMISSION  Once                          Discharge Date: 12/23/24    Consultations During Hospital Stay:  NA    Procedures Performed:   CT chestTrace pleural effusions. No acute infiltrate.     2.  High density throughout the esophagus. Correlation with recent ingestion recommended.     3.  Right-sided nephrolithiasis.     4.  Mild diffuse hepatic steatosis.    Significant Findings / Test Results:   RP 2 panel positive for parainfluenza    Incidental Findings:   NA       Test Results Pending at Discharge (will require follow up):   Blood cultures negative for 24 hours  Nasal MRSA swab pending  Complications:  none     Reason for Admission: Fever and shortness of  "breath    Hospital Course:   Kerrie Hoffman is a 59 y.o. female patient who originally presented to the hospital on 12/21/2024 due to neutropenic sepsis.  Started on broad-spectrum IV antibiotics.  CT chest was negative for pneumonia.  Was started on IV steroids for asthmatic bronchitis.  RP 2 panel positive for parainfluenza.  Continue to have low white counts in the setting of recent chemotherapy with ANC less than 500.  Received 1 dose of filgrastim.  Discussed with patient need for ongoing hospitalization for monitoring of culture results and improvement in counts.  Patient however wanted to get discharged and left AGAINST MEDICAL ADVICE.  Discussed with patient's primary oncology team at [facility].  Their recommendation was also for patient to stay in the hospital however since patient had decided to leave, they recommended progressed in 1 dose and put follow-up blood work within 3 days of discharge.  Discharge instructions and recommendations from oncology team were discussed with the patient.  In fact patient's oncology team also called her to discuss these recommendations.  Finalized blood culture results and nasal MRSA swab were pending upon discharge.  Patient will be discharged on prednisone to complete 5 days of treatment along with antibiotics.          Please see above list of diagnoses and related plan for additional information.     Condition at Discharge: stable    Discharge Day Visit / Exam:   Subjective: Patient seen and examined at bedside.  She states that her breathing is back to baseline.  She is off supplemental oxygen.  Remains afebrile.  Vitals: Blood Pressure: (!) 172/83 (12/23/24 0755)  Pulse: 73 (12/23/24 0755)  Temperature: (!) 97.2 °F (36.2 °C) (12/23/24 0755)  Temp Source: Temporal (12/21/24 2238)  Respirations: 18 (12/23/24 0755)  Height: 5' 6\" (167.6 cm) (12/21/24 1957)  Weight - Scale: 130 kg (287 lb 11.2 oz) (12/22/24 1340)  SpO2: 98 % (12/23/24 0755)  Physical " Exam  Constitutional:       General: She is not in acute distress.     Appearance: She is obese. She is not ill-appearing.   HENT:      Head: Normocephalic.      Nose: Nose normal.      Mouth/Throat:      Mouth: Mucous membranes are moist.   Eyes:      Extraocular Movements: Extraocular movements intact.      Pupils: Pupils are equal, round, and reactive to light.   Cardiovascular:      Rate and Rhythm: Normal rate and regular rhythm.      Pulses: Normal pulses.   Pulmonary:      Effort: No respiratory distress.      Breath sounds: No wheezing or rales.   Abdominal:      General: Abdomen is flat. Bowel sounds are normal.      Palpations: Abdomen is soft.   Musculoskeletal:      Right lower leg: No edema.      Left lower leg: No edema.   Neurological:      General: No focal deficit present.      Mental Status: She is oriented to person, place, and time. Mental status is at baseline.          Discussion with Family: Updated  () at bedside.    Discharge instructions/Information to patient and family:   See after visit summary for information provided to patient and family.      Provisions for Follow-Up Care:  See after visit summary for information related to follow-up care and any pertinent home health orders.      Mobility at time of Discharge:   Basic Mobility Inpatient Raw Score: 21  JH-HLM Goal: 6: Walk 10 steps or more  JH-HLM Achieved: 6: Walk 10 steps or more  HLM Goal achieved. Continue to encourage appropriate mobility.     Disposition:   Home    Planned Readmission: no    Discharge Medications:  See after visit summary for reconciled discharge medications provided to patient and/or family.      Administrative Statements   Discharge Statement:  I have spent a total time of >30 minutes in caring for this patient on the day of the visit/encounter. .    **Please Note: This note may have been constructed using a voice recognition system**

## 2024-12-26 LAB
BACTERIA BLD CULT: NORMAL
BACTERIA BLD CULT: NORMAL

## 2025-01-06 ENCOUNTER — EVALUATION (OUTPATIENT)
Age: 60
End: 2025-01-06
Payer: MEDICARE

## 2025-01-06 DIAGNOSIS — I89.0 LYMPHEDEMA OF ARM: Primary | ICD-10-CM

## 2025-01-06 PROCEDURE — 97140 MANUAL THERAPY 1/> REGIONS: CPT

## 2025-01-06 NOTE — PROGRESS NOTES
Today's date: 2025  Patient name: Kerrie Hoffman  : 1965  MRN: [id number]  Referring provider: Celi Chong,*  Dx:   Encounter Diagnosis     ICD-10-CM    1. Lymphedema of arm  I89.0                      Plan  Patient would benefit from: Initiation of full decongestive therapy to include MLD, compression therapy and therapeutic exercises.   Planned therapy interventions: Education, MLD, Compression, ROM, Therapeutic exercises and manual therapy.       Patient presents for post chemo measurement and consult for lymphedema since she has received compressive device for R UE. She had axillary nodes at R breast removed.  Swelling began after second cycle of chemotherapy. She would like to initiate PT for lymphedema as quickly as possible. Measured for compression sleeve and glove today. She will [facility] for compressive [facility].     STG  - Patient and/or caregiver will understand lymphedema precautions to decrease risk of infection and exacerbation of lymphedema  - Patient will develop a tolerance for wearing multi-layer, short stretch bandages betweens sessions to facilitate limb decongestion  - Patient will experience decrease in pitting edema which will improve tissue health and decrease risk of infection.   - Patient will perform HEP independently or with minimal assistance to help improve lymphatic flow and venous return    LTG    - Patient and/or caregiver will be independent with donning and doffing of compression garments which will enable regular daily garment wear at time of discharge, skin maintenance, and self- MLD   - Patient and/or caregiver will be independent with HEP and lymphedema management to prevent relapse and reduce risk of infection and hospitalizations at time of discharge      Frequency (visits/wk): 2x week   Duration (weeks):12 weeks  POC Start date: 2025  POC End date: 3/31/2025    Discussed with: Patient and     Subjective    Kerrie Hoffman is a  "59 y.o. year old female who presents to IE for discussion/consult of lymphedema. 7 months ago was dx with right side breast cancer. Had 12 lymphnodes removed on 2024. She has been cleared to initiate full CDT protocol prior to radiation.   She notes stiffness with shoulder movement and describes \"tension\" along pectoral and upper arm.     Does have hx of CHF that is controlled with meds. Denies hx of DVT. Had acute kidney injury with medication a few years ago but this has passed and no other kidney issues noted.    She is accompanied by . She is not currently working. Was injured at work, with notable neck and back injuries    Kerrie presents to re-eval for measurements after to lymph node dissection and completion of chemotherapy.    Diagnosed in: 2024    Home setting: Single family dwelling  Support:   Goals: Resolve post op swelling    Objective - Fredis Fowler, PTA, CLT      UPPER EXTREMITY RIGHT CALCULATIONS      Flowsheet Row Most Recent Value   Volume UE (mL) 5466   Difference from last visit (mL)  -170   Difference from first visit (mL)  -170   Difference from last visit (%)  -3   Difference from first visit (%)  -3              Total of 1,090 mL increase since last measure. CDT protocol indicated.     Shoulder ROM shoulder abduction 160*. Full all other planes. Tenderness to palpation axillary region. No axillary banding noted.       Physical assessment: R UE appears equal to L UE  Palpation:no firmness or swelling noted.   Skin Mobility: good  Skin color: normal  Pittin  Wound presence: no wounds present  Wound size: n/a  Wound color: n/a  Stemmer's Sign: -     Transfer status: I      Precautions: CHF (controlled with meds)    Daily Treatment Diary:        Compliance 8/26/24  10/10  11/20  1/6               Visit Number 1 2  3  4               Re-Eval  IE                 MC   Foto Captured Y                            Manuals 8/26 10/10 11/20 1/6         measurements sj sj SJ SJ  "        education sj sj SJ SJ         Self care sj SJ           MLD    SJ         Neuro Re-Ed                                                                                                        Ther Ex                                       HEP review  See below                                                                            Ther Activity                                       Gait Training                                       Modalities                                          PT reviewed with patient lymphedema education of skin care including: keeping extremity clean and dry, applying moisturizer daily, special attention to nail care, wearing sunscreen avoiding nicks and skin irritation from razors, wearing gloves with activities that may cause skin injury.   Also reviewed for patient to avoid excessive constriction such as tight clothing and jewelry, extreme temperature changes, and the importance of compliance for bandaging and garments.   Patient also advised to contact physician if experiencing any constitutional symptoms, swelling, redness, pain, rash, itching, or increased skin temperature.

## 2025-01-08 ENCOUNTER — OFFICE VISIT (OUTPATIENT)
Age: 60
End: 2025-01-08
Payer: MEDICARE

## 2025-01-08 DIAGNOSIS — I89.0 LYMPHEDEMA OF ARM: Primary | ICD-10-CM

## 2025-01-08 PROCEDURE — 97140 MANUAL THERAPY 1/> REGIONS: CPT

## 2025-01-08 NOTE — PROGRESS NOTES
Daily Note     Today's date: 2025  Patient name: Kerrie Hoffman  : 1965  MRN: [id number]  Referring provider: Celi Chong,*  Dx:   Encounter Diagnosis     ICD-10-CM    1. Lymphedema of arm  I89.0                      Subjective: Patient reports arm feels a lot less full and she can move hand better.       Objective: See treatment diary below      Assessment: Kerrie Hoffman tolerated MLD to R UE crossing R transverse anastomosis to R inguinal nodes. Good hydration of skin with no scar. No signs or symptoms of skin infection noted. Continued treatment should benefit.      Plan: Continue per plan of care.        POC expires Unit limit Auth Expiration date PT/OT/ST + Visit Limit?   25 BOMN NA BOMN                             Visit/Unit Tracking  AUTH Status:  Date              NA Used 1 2              Remaining                    Precautions: CHF (controlled with meds)    Daily Treatment Diary:       Manuals 8/26 10/10 11/20 1/6 1/8        measurements sj sj SJ SJ         education sj sj SJ SJ         Self care sj SJ           MLD    SJ SJ        Neuro Re-Ed                                                                                                        Ther Ex                                       HEP review  See below                                                                            Ther Activity                                       Gait Training                                       Modalities                                          PT reviewed with patient lymphedema education of skin care including: keeping extremity clean and dry, applying moisturizer daily, special attention to nail care, wearing sunscreen avoiding nicks and skin irritation from razors, wearing gloves with activities that may cause skin injury.   Also reviewed for patient to avoid excessive constriction such as tight clothing and jewelry, extreme temperature changes, and the importance of compliance for  bandaging and garments.   Patient also advised to contact physician if experiencing any constitutional symptoms, swelling, redness, pain, rash, itching, or increased skin temperature.

## 2025-01-13 ENCOUNTER — OFFICE VISIT (OUTPATIENT)
Age: 60
End: 2025-01-13
Payer: MEDICARE

## 2025-01-13 DIAGNOSIS — I89.0 LYMPHEDEMA OF ARM: Primary | ICD-10-CM

## 2025-01-13 PROCEDURE — 97140 MANUAL THERAPY 1/> REGIONS: CPT

## 2025-01-13 NOTE — PROGRESS NOTES
Daily Note     Today's date: 2025  Patient name: Kerrie Hoffman  : 1965  MRN: [id number]  Referring provider: Celi Chong,*  Dx:   Encounter Diagnosis     ICD-10-CM    1. Lymphedema of arm  I89.0                      Subjective: Patient reports everything seems to be going well.       Objective: See treatment diary below      Assessment: Kerrie Hoffman tolerated MLD to R UE crossing R transverse anastomosis to R inguinal nodes. Good hydration of skin with no scar. No signs or symptoms of skin infection noted. Continued treatment should benefit.      Plan: Continue per plan of care.        POC expires Unit limit Auth Expiration date PT/OT/ST + Visit Limit?   25 BOMN NA BOMN                             Visit/Unit Tracking  AUTH Status:  Date             NA Used 1 2 3             Remaining                    Precautions: CHF (controlled with meds)    Daily Treatment Diary:       Manuals 8/26 10/10 11/20 1/6 1/8 1/13       measurements sj sj SJ SJ         education sj sj SJ SJ         Self care sj SJ           MLD    SJ SJ SJ       Neuro Re-Ed                                                                                                        Ther Ex                                       HEP review  See below                                                                            Ther Activity                                       Gait Training                                       Modalities                                          PT reviewed with patient lymphedema education of skin care including: keeping extremity clean and dry, applying moisturizer daily, special attention to nail care, wearing sunscreen avoiding nicks and skin irritation from razors, wearing gloves with activities that may cause skin injury.   Also reviewed for patient to avoid excessive constriction such as tight clothing and jewelry, extreme temperature changes, and the importance of compliance for bandaging  and garments.   Patient also advised to contact physician if experiencing any constitutional symptoms, swelling, redness, pain, rash, itching, or increased skin temperature.

## 2025-01-15 ENCOUNTER — APPOINTMENT (OUTPATIENT)
Age: 60
End: 2025-01-15
Payer: MEDICARE

## 2025-01-16 ENCOUNTER — OFFICE VISIT (OUTPATIENT)
Age: 60
End: 2025-01-16
Payer: MEDICARE

## 2025-01-16 DIAGNOSIS — I89.0 LYMPHEDEMA OF ARM: Primary | ICD-10-CM

## 2025-01-16 PROCEDURE — 97140 MANUAL THERAPY 1/> REGIONS: CPT

## 2025-01-16 NOTE — PROGRESS NOTES
Daily Note     Today's date: 2025  Patient name: Kerrie Hoffman  : 1965  MRN: [id number]  Referring provider: Celi Chong,*  Dx:   Encounter Diagnosis     ICD-10-CM    1. Lymphedema of arm  I89.0                      Subjective: Patient reports she feels gauntlet is less compressive than sleeve. She has ordered a new one.       Objective: See treatment diary below      Assessment: Kerrie Hoffman tolerated MLD to R UE crossing R transverse anastomosis to R inguinal nodes. Good hydration of skin with no scar. No signs or symptoms of skin infection noted. Swelling greater in hand indicating inappropriate White's Law compression. New gauntlet should benefit. Continued treatment should benefit.      Plan: Continue per plan of care.        POC expires Unit limit Auth Expiration date PT/OT/ST + Visit Limit?   25 BOMN NA BOMN                             Visit/Unit Tracking  AUTH Status:  Date            NA Used 1 2 3 4            Remaining                    Precautions: CHF (controlled with meds)    Daily Treatment Diary:       Manuals 8/26 10/10 11/20 1/6 1/8 1/13 1/16      measurements sj sj SJ SJ         education sj sj SJ SJ         Self care sj SJ           MLD    SJ SJ SJ SJ      Neuro Re-Ed                                                                                                        Ther Ex                                       HEP review  See below                                                                            Ther Activity                                       Gait Training                                       Modalities                                          PT reviewed with patient lymphedema education of skin care including: keeping extremity clean and dry, applying moisturizer daily, special attention to nail care, wearing sunscreen avoiding nicks and skin irritation from razors, wearing gloves with activities that may cause skin injury.   Also  reviewed for patient to avoid excessive constriction such as tight clothing and jewelry, extreme temperature changes, and the importance of compliance for bandaging and garments.   Patient also advised to contact physician if experiencing any constitutional symptoms, swelling, redness, pain, rash, itching, or increased skin temperature.

## 2025-01-20 PROBLEM — D70.9 NEUTROPENIC SEPSIS (HCC): Status: RESOLVED | Noted: 2024-12-21 | Resolved: 2025-01-20

## 2025-01-20 PROBLEM — A41.9 NEUTROPENIC SEPSIS (HCC): Status: RESOLVED | Noted: 2024-12-21 | Resolved: 2025-01-20

## 2025-01-21 ENCOUNTER — OFFICE VISIT (OUTPATIENT)
Age: 60
End: 2025-01-21
Payer: MEDICARE

## 2025-01-21 DIAGNOSIS — I89.0 LYMPHEDEMA OF ARM: Primary | ICD-10-CM

## 2025-01-21 PROCEDURE — 97140 MANUAL THERAPY 1/> REGIONS: CPT

## 2025-01-21 NOTE — PROGRESS NOTES
Daily Note     Today's date: 2025  Patient name: Kerrie Hoffman  : 1965  MRN: [id number]  Referring provider: Celi Chong,*  Dx:   Encounter Diagnosis     ICD-10-CM    1. Lymphedema of arm  I89.0                      Subjective: Patient reports she is doing well with keeping precautions.       Objective: See treatment diary below      Assessment:  Kerrie Hoffman tolerated MLD to R UE crossing R transverse anastomosis to R inguinal nodes. Good hydration of skin with no scar. No signs or symptoms of skin infection noted. Continued treatment should benefit.      Plan: Continue per plan of care.        POC expires Unit limit Auth Expiration date PT/OT/ST + Visit Limit?   25 BOMN NA BOMN                             Visit/Unit Tracking  AUTH Status:  Date           NA Used 1 2 3 4 5           Remaining                    Precautions: CHF (controlled with meds)    Daily Treatment Diary:       Manuals 8/26 10/10 11/20 1/6 1/8 1/13 1/16 1/21     measurements sj sj SJ SJ         education sj sj SJ SJ    SJ     Self care sj SJ           MLD    SJ SJ SJ SJ SJ     Neuro Re-Ed                                                                                                        Ther Ex                                       HEP review  See below                                                                            Ther Activity                                       Gait Training                                       Modalities                                          PT reviewed with patient lymphedema education of skin care including: keeping extremity clean and dry, applying moisturizer daily, special attention to nail care, wearing sunscreen avoiding nicks and skin irritation from razors, wearing gloves with activities that may cause skin injury.   Also reviewed for patient to avoid excessive constriction such as tight clothing and jewelry, extreme temperature changes, and the  importance of compliance for bandaging and garments.   Patient also advised to contact physician if experiencing any constitutional symptoms, swelling, redness, pain, rash, itching, or increased skin temperature.

## 2025-01-23 ENCOUNTER — OFFICE VISIT (OUTPATIENT)
Age: 60
End: 2025-01-23
Payer: MEDICARE

## 2025-01-23 DIAGNOSIS — I89.0 LYMPHEDEMA OF ARM: Primary | ICD-10-CM

## 2025-01-23 PROCEDURE — 97140 MANUAL THERAPY 1/> REGIONS: CPT

## 2025-01-23 NOTE — PROGRESS NOTES
Daily Note     Today's date: 2025  Patient name: Kerrie Hoffman  : 1965  MRN: [id number]  Referring provider: Celi Chong,*  Dx:   Encounter Diagnosis     ICD-10-CM    1. Lymphedema of arm  I89.0                      Subjective: Patient reports her garment has torn. Reports would like to order one.       Objective: See treatment diary below      Assessment: Kerrie Hoffman tolerated MLD to R UE crossing R transverse anastomosis to R inguinal nodes. Good hydration of skin with no scar. No signs or symptoms of skin infection noted. Measured for garment and will order. Continued treatment should benefit.      Plan: Continue per plan of care.        POC expires Unit limit Auth Expiration date PT/OT/ST + Visit Limit?   25 BOMN NA BOMN                             Visit/Unit Tracking  AUTH Status:  Date          NA Used 1 2 3 4 5 6          Remaining                    Precautions: CHF (controlled with meds)    Daily Treatment Diary:       Manuals 8/26 10/10 11/20 1/6 1/8 1/13 1/16 1/21 1/23    measurements sj sj SJ SJ         education sj sj SJ SJ    SJ     Self care sj SJ           MLD    SJ SJ SJ SJ SJ SJ    Garments         SJ    Neuro Re-Ed                                                                                                        Ther Ex                                       HEP review  See below                                                                            Ther Activity                                       Gait Training                                       Modalities                                          PT reviewed with patient lymphedema education of skin care including: keeping extremity clean and dry, applying moisturizer daily, special attention to nail care, wearing sunscreen avoiding nicks and skin irritation from razors, wearing gloves with activities that may cause skin injury.   Also reviewed for patient to avoid excessive  constriction such as tight clothing and jewelry, extreme temperature changes, and the importance of compliance for bandaging and garments.   Patient also advised to contact physician if experiencing any constitutional symptoms, swelling, redness, pain, rash, itching, or increased skin temperature.

## 2025-01-27 ENCOUNTER — OFFICE VISIT (OUTPATIENT)
Age: 60
End: 2025-01-27
Payer: MEDICARE

## 2025-01-27 DIAGNOSIS — I89.0 LYMPHEDEMA OF ARM: Primary | ICD-10-CM

## 2025-01-27 PROCEDURE — 97140 MANUAL THERAPY 1/> REGIONS: CPT

## 2025-01-27 NOTE — PROGRESS NOTES
Daily Note     Today's date: 2025  Patient name: Kerrie Hoffman  : 1965  MRN: [id number]  Referring provider: Celi Chong,*  Dx:   Encounter Diagnosis     ICD-10-CM    1. Lymphedema of arm  I89.0                      Subjective: Patient reports no new C/O.       Objective: See treatment diary below      Assessment: Kerrie Hoffman tolerated MLD to R UE crossing R transverse anastomosis to R inguinal nodes. Good hydration of skin with no scar. No signs or symptoms of skin infection noted. Continued treatment should benefit.      Plan: Continue per plan of care.        POC expires Unit limit Auth Expiration date PT/OT/ST + Visit Limit?   25 BOMN NA BOMN                             Visit/Unit Tracking  AUTH Status:  Date         NA Used 1 2 3 4 5 6 7         Remaining                    Precautions: CHF (controlled with meds)    Daily Treatment Diary:       Manuals 8/26 10/10 11/20 1/6 1/8 1/13 1/16 1/21 1/23 1/27   measurements sj sj SJ SJ         education sj sj SJ SJ    SJ     Self care sj SJ           MLD    SJ SJ SJ SJ SJ SJ SJ   Garments         SJ    Neuro Re-Ed                                                                                                        Ther Ex                                       HEP review  See below                                                                            Ther Activity                                       Gait Training                                       Modalities                                          PT reviewed with patient lymphedema education of skin care including: keeping extremity clean and dry, applying moisturizer daily, special attention to nail care, wearing sunscreen avoiding nicks and skin irritation from razors, wearing gloves with activities that may cause skin injury.   Also reviewed for patient to avoid excessive constriction such as tight clothing and jewelry, extreme temperature changes,  and the importance of compliance for bandaging and garments.   Patient also advised to contact physician if experiencing any constitutional symptoms, swelling, redness, pain, rash, itching, or increased skin temperature.

## 2025-01-29 ENCOUNTER — OFFICE VISIT (OUTPATIENT)
Age: 60
End: 2025-01-29
Payer: MEDICARE

## 2025-01-29 DIAGNOSIS — I89.0 LYMPHEDEMA OF ARM: Primary | ICD-10-CM

## 2025-01-29 PROCEDURE — 97140 MANUAL THERAPY 1/> REGIONS: CPT

## 2025-01-29 NOTE — PROGRESS NOTES
Daily Note     Today's date: 2025  Patient name: Kerrie Hoffman  : 1965  MRN: [id number]  Referring provider: Celi Chong,*  Dx:   Encounter Diagnosis     ICD-10-CM    1. Lymphedema of arm  I89.0                      Subjective: Patient reports doctor would like her arm measurements. Reports overall feeling good. Has 2 consults for radiation at 2 different local hospitals.      Objective: See treatment diary below      Assessment: Kerrie Hoffman tolerated MLD to R UE crossing R transverse anastomosis to R inguinal nodes. Good hydration of skin with no scar. No signs or symptoms of skin infection noted. Continued treatment should benefit.      Plan: Continue per plan of care.        POC expires Unit limit Auth Expiration date PT/OT/ST + Visit Limit?   25 BOMN NA BOMN                             Visit/Unit Tracking  AUTH Status:  Date        NA Used 1 2 3 4 5 6 7 8        Remaining                    Precautions: CHF (controlled with meds)    Daily Treatment Diary:       Manuals 1/29 10/10 11/20 1/6 1/8 1/13 1/16 1/21 1/23 1/27   measurements  sj SJ SJ         education  sj SJ SJ    SJ     Self care  SJ           MLD SJ   SJ SJ SJ SJ SJ SJ SJ   Garments         SJ    Neuro Re-Ed                                                                                                        Ther Ex                                       HEP review  See below                                                                            Ther Activity                                       Gait Training                                       Modalities                                          PT reviewed with patient lymphedema education of skin care including: keeping extremity clean and dry, applying moisturizer daily, special attention to nail care, wearing sunscreen avoiding nicks and skin irritation from razors, wearing gloves with activities that may cause skin injury.   Also  reviewed for patient to avoid excessive constriction such as tight clothing and jewelry, extreme temperature changes, and the importance of compliance for bandaging and garments.   Patient also advised to contact physician if experiencing any constitutional symptoms, swelling, redness, pain, rash, itching, or increased skin temperature.

## 2025-02-04 ENCOUNTER — OFFICE VISIT (OUTPATIENT)
Age: 60
End: 2025-02-04
Payer: MEDICARE

## 2025-02-04 DIAGNOSIS — I89.0 LYMPHEDEMA OF ARM: Primary | ICD-10-CM

## 2025-02-04 PROCEDURE — 97140 MANUAL THERAPY 1/> REGIONS: CPT

## 2025-02-04 NOTE — PROGRESS NOTES
Daily Note     Today's date: 2025  Patient name: Kerrie Hoffman  : 1965  MRN: [id number]  Referring provider: Celi Chong,*  Dx:   Encounter Diagnosis     ICD-10-CM    1. Lymphedema of arm  I89.0                      Subjective: Patient reports she received her nwe compression sleeve in the mail.       Objective: See treatment diary below      Assessment: Kerrie Hoffman tolerated MLD to R UE crossing R transverse anastomosis to R inguinal nodes. Good hydration of skin with no scar. No signs or symptoms of skin infection noted. Continued treatment should benefit.      Plan: Progress note during next visit.        POC expires Unit limit Auth Expiration date PT/OT/ST + Visit Limit?   25 BOMN NA BOMN                             Visit/Unit Tracking  AUTH Status:  Date       NA Used 1 2 3 4 5 6 7 8 9       Remaining                    Precautions: CHF (controlled with meds)    Daily Treatment Diary:       Manuals    measurements   SJ SJ         education   SJ SJ    SJ     Self care             MLD SJ SJ  SJ SJ SJ SJ SJ SJ SJ   Garments         SJ    Neuro Re-Ed                                                                                                        Ther Ex                                       HEP review                                                                              Ther Activity                                       Gait Training                                       Modalities                                          PT reviewed with patient lymphedema education of skin care including: keeping extremity clean and dry, applying moisturizer daily, special attention to nail care, wearing sunscreen avoiding nicks and skin irritation from razors, wearing gloves with activities that may cause skin injury.   Also reviewed for patient to avoid excessive constriction such as tight clothing and  jewelry, extreme temperature changes, and the importance of compliance for bandaging and garments.   Patient also advised to contact physician if experiencing any constitutional symptoms, swelling, redness, pain, rash, itching, or increased skin temperature.

## 2025-02-06 ENCOUNTER — APPOINTMENT (OUTPATIENT)
Age: 60
End: 2025-02-06
Payer: MEDICARE

## 2025-02-13 ENCOUNTER — APPOINTMENT (OUTPATIENT)
Age: 60
End: 2025-02-13
Payer: MEDICARE

## 2025-02-17 ENCOUNTER — OFFICE VISIT (OUTPATIENT)
Age: 60
End: 2025-02-17
Payer: MEDICARE

## 2025-02-17 DIAGNOSIS — I89.0 LYMPHEDEMA OF ARM: Primary | ICD-10-CM

## 2025-02-17 PROCEDURE — 97140 MANUAL THERAPY 1/> REGIONS: CPT

## 2025-02-17 NOTE — PROGRESS NOTES
Daily Note     Today's date: 2025  Patient name: Kerrie Hoffman  : 1965  MRN: [id number]  Referring provider: Celi Chong,*  Dx:   Encounter Diagnosis     ICD-10-CM    1. Lymphedema of arm  I89.0                      Subjective: Patient reports she has noted and good reduction is swelling since new sleeve. Does note she cannot wear sleeve for hours.      Objective: See treatment diary below      Assessment: Kerrie Hoffman tolerated MLD to R UE crossing R transverse anastomosis to R inguinal nodes. Good hydration of skin with no scar. No signs or symptoms of skin infection noted. Continued treatment should benefit.      Plan: Continue per plan of care.        POC expires Unit limit Auth Expiration date PT/OT/ST + Visit Limit?   25 BOMN NA BOMN                             Visit/Unit Tracking  AUTH Status:  Date      NA Used 1 2 3 4 5 6 7 8 9 10      Remaining                    Precautions: CHF (controlled with meds)    Daily Treatment Diary:       Manuals    measurements    SJ         education    SJ    SJ     Self care             MLD SJ SJ SJ SJ SJ SJ SJ SJ SJ SJ   Garments         SJ    Neuro Re-Ed                                                                                                        Ther Ex                                       HEP review                                                                              Ther Activity                                       Gait Training                                       Modalities                                          PT reviewed with patient lymphedema education of skin care including: keeping extremity clean and dry, applying moisturizer daily, special attention to nail care, wearing sunscreen avoiding nicks and skin irritation from razors, wearing gloves with activities that may cause skin injury.   Also reviewed for patient to avoid  excessive constriction such as tight clothing and jewelry, extreme temperature changes, and the importance of compliance for bandaging and garments.   Patient also advised to contact physician if experiencing any constitutional symptoms, swelling, redness, pain, rash, itching, or increased skin temperature.

## 2025-02-20 ENCOUNTER — OFFICE VISIT (OUTPATIENT)
Age: 60
End: 2025-02-20
Payer: MEDICARE

## 2025-02-20 DIAGNOSIS — I89.0 LYMPHEDEMA OF ARM: Primary | ICD-10-CM

## 2025-02-20 PROCEDURE — 97140 MANUAL THERAPY 1/> REGIONS: CPT

## 2025-02-20 NOTE — PROGRESS NOTES
Daily Note     Today's date: 2025  Patient name: Kerrie Hoffman  : 1965  MRN: [id number]  Referring provider: Celi Chong,*  Dx:   Encounter Diagnosis     ICD-10-CM    1. Lymphedema of arm  I89.0                      Subjective: Patient reports she has noted hand swelling is a little stiffer.       Objective: See treatment diary below      Assessment: Kerrie Hoffman tolerated MLD to R UE crossing R transverse anastomosis to R inguinal nodes. Good hydration of skin with no scar. No signs or symptoms of skin infection noted. Discussed possibly switching to gloved fingers versus gauntlet. Continued treatment should benefit.      Plan: Progress note during next visit.        POC expires Unit limit Auth Expiration date PT/OT/ST + Visit Limit?   25 BOMN NA BOMN                             Visit/Unit Tracking  AUTH Status:  Date     NA Used 1 2 3 4 5 6 7 8 9 10 11     Remaining                    Precautions: CHF (controlled with meds)    Daily Treatment Diary:       Manuals    measurements             education        SJ     Self care             MLD SJ SJ SJ SJ SJ SJ SJ SJ SJ SJ   Garments         SJ    Neuro Re-Ed                                                                                                        Ther Ex                                       HEP review                                                                              Ther Activity                                       Gait Training                                       Modalities                                          PT reviewed with patient lymphedema education of skin care including: keeping extremity clean and dry, applying moisturizer daily, special attention to nail care, wearing sunscreen avoiding nicks and skin irritation from razors, wearing gloves with activities that may cause skin injury.   Also reviewed for  patient to avoid excessive constriction such as tight clothing and jewelry, extreme temperature changes, and the importance of compliance for bandaging and garments.   Patient also advised to contact physician if experiencing any constitutional symptoms, swelling, redness, pain, rash, itching, or increased skin temperature.

## 2025-02-24 ENCOUNTER — EVALUATION (OUTPATIENT)
Age: 60
End: 2025-02-24
Payer: MEDICARE

## 2025-02-24 DIAGNOSIS — I89.0 LYMPHEDEMA OF ARM: Primary | ICD-10-CM

## 2025-02-24 PROCEDURE — 97140 MANUAL THERAPY 1/> REGIONS: CPT

## 2025-02-24 NOTE — PROGRESS NOTES
PT Re-Evaluation   Today's date: 2025  Patient name: Kerrie Hoffman  : 1965  MRN: [id number]  Referring provider: Celi Chong,*  Dx:   Encounter Diagnosis     ICD-10-CM    1. Lymphedema of arm  I89.0         Start Time: 1000  Stop Time: 1050  Total time in clinic (min): 50 minutes    Plan  Patient would benefit from: Continued full decongestive therapy to include MLD, compression therapy and therapeutic exercises.   Planned therapy interventions: Education, MLD, Compression, ROM, Therapeutic exercises and manual therapy.     Patient presents for post chemo measurement and consult for lymphedema since she has received compressive device for R UE. She had axillary nodes at R breast removed.  Swelling began after second cycle of chemotherapy. She would like to initiate PT for lymphedema as quickly as possible. Measured for compression sleeve and glove today. She will [facility] for compressive bra.     STG  - Patient and/or caregiver will understand lymphedema precautions to decrease risk of infection and exacerbation of lymphedema Met  - Patient will develop a tolerance for wearing multi-layer, short stretch bandages betweens sessions to facilitate limb decongestion Met  - Patient will experience decrease in pitting edema which will improve tissue health and decrease risk of infection. Met  - Patient will perform HEP independently or with minimal assistance to help improve lymphatic flow and venous return    LTG  - Patient and/or caregiver will be independent with donning and doffing of compression garments which will enable regular daily garment wear at time of discharge, skin maintenance, and self- MLD   - Patient and/or caregiver will be independent with HEP and lymphedema management to prevent relapse and reduce risk of infection and hospitalizations at time of discharge    Frequency (visits/wk): 2x week   Duration (weeks):12 weeks  POC Start date: 2025  POC End date:  "3/31/2025    Discussed with: Patient and     Subjective    Kerrie Hoffman is a 59 y.o. year old female who presents to IE for discussion/consult of lymphedema. 7 months ago was dx with right side breast cancer. Had 12 lymphnodes removed on 9/12/2024. She has been cleared to initiate full CDT protocol prior to radiation.   She notes stiffness with shoulder movement and describes \"tension\" along pectoral and upper arm.     Does have hx of CHF that is controlled with meds. Denies hx of DVT. Had acute kidney injury with medication a few years ago but this has passed and no other kidney issues noted.    She is accompanied by . She is not currently working. Was injured at work, with notable neck and back injuries    Kerrie presents to re-eval for measurements after to lymph node dissection and completion of chemotherapy.    Diagnosed in: June 2024 2/24/2025 Update:  Kerrie has felt a consistent improvement in swelling particularly in the hand. She does note that the heavier compression  causes her discomfort and she is happier wearing lower compression. She is starting radiation in one week and is nervous about swelling increasing because the doctor told her it could happen. She would like to continue treatment while on radiation.     Home setting: Single family dwelling  Support:   Goals: Resolve post op swelling    Objective - Fredis Fowler, PTA, CLT      UPPER EXTREMITY RIGHT CALCULATIONS      Flowsheet Row Most Recent Value   Volume UE (mL) 5283   Difference from last visit (mL)  183   Difference from first visit (mL)  -1081   Difference from last visit (%)  3   Difference from first visit (%)  -26          From 1/6/25  UPPER EXTREMITY RIGHT CALCULATIONS      Flowsheet Row Most Recent Value   Volume UE (mL) 5466   Difference from last visit (mL)  -170   Difference from first visit (mL)  -170   Difference from last visit (%)  -3   Difference from first visit (%)  -3          Shoulder ROM shoulder " abduction 160*. Full all other planes. Tenderness to palpation axillary region. No axillary banding noted.     Physical assessment: R UE appears equal to L UE  Palpation:Forearm firmness   Skin Mobility: good  Skin color: normal  Pittin  Wound presence: no wounds present  Wound size: n/a  Wound color: n/a  Stemmer's Sign: -     Transfer status: I     POC expires Unit limit Auth Expiration date PT/OT/ST + Visit Limit?   25 BOMN NA BOMN                             Visit/Unit Tracking  AUTH Status:  Date 1/6 1/8 1/13 1/16 1/21 1/23 1/27 1/29 2/4 2/17 2/20 2/24   NA Used 1 2 3 4 5 6 7 8 9 10 11 12    Remaining                  Precautions: CHF (controlled with meds)    Daily Treatment Diary:     Manuals    measurements     SJ        education        SJ     Self care             MLD SJ SJ SJ SJ SJ SJ SJ SJ SJ SJ   Garments         SJ    Neuro Re-Ed                                                                                                        Ther Ex                                       HEP review                                                                              Ther Activity                                       Gait Training                                       Modalities                                             PT reviewed with patient lymphedema education of skin care including: keeping extremity clean and dry, applying moisturizer daily, special attention to nail care, wearing sunscreen avoiding nicks and skin irritation from razors, wearing gloves with activities that may cause skin injury.   Also reviewed for patient to avoid excessive constriction such as tight clothing and jewelry, extreme temperature changes, and the importance of compliance for bandaging and garments.   Patient also advised to contact physician if experiencing any constitutional symptoms, swelling, redness, pain, rash, itching, or increased skin temperature.

## 2025-02-27 ENCOUNTER — OFFICE VISIT (OUTPATIENT)
Age: 60
End: 2025-02-27
Payer: MEDICARE

## 2025-02-27 DIAGNOSIS — I89.0 LYMPHEDEMA OF ARM: Primary | ICD-10-CM

## 2025-02-27 PROCEDURE — 97140 MANUAL THERAPY 1/> REGIONS: CPT

## 2025-02-27 NOTE — PROGRESS NOTES
Daily Note     Today's date: 2025  Patient name: Kerrie Hoffman  : 1965  MRN: [id number]  Referring provider: Celi Chong,*  Dx:   Encounter Diagnosis     ICD-10-CM    1. Lymphedema of arm  I89.0                      Subjective: Patient reports hand swelling finally feel like its letting up.       Objective: See treatment diary below      Assessment: Kerrie Hoffman tolerated MLD to R UE crossing R transverse anastomosis to R inguinal nodes. Good hydration of skin with no scar. No signs or symptoms of skin infection noted. Discussed possibly switching to gloved fingers versus gauntlet. Continued treatment should benefit.      Plan: Continue per plan of care.        POC expires Unit limit Auth Expiration date PT/OT/ST + Visit Limit?   25 BOMN NA BOMN                             Visit/Unit Tracking  AUTH Status:  Date              NA Used 1 2              Remaining                    Precautions: CHF (controlled with meds)    Daily Treatment Diary:       Manuals    measurements             education        SJ     Self care             MLD SJ SJ SJ SJ SJ SJ SJ SJ SJ SJ   Garments         SJ    Neuro Re-Ed                                                                                                        Ther Ex                                       HEP review                                                                              Ther Activity                                       Gait Training                                       Modalities                                          PT reviewed with patient lymphedema education of skin care including: keeping extremity clean and dry, applying moisturizer daily, special attention to nail care, wearing sunscreen avoiding nicks and skin irritation from razors, wearing gloves with activities that may cause skin injury.   Also reviewed for patient to avoid excessive constriction such as tight  clothing and jewelry, extreme temperature changes, and the importance of compliance for bandaging and garments.   Patient also advised to contact physician if experiencing any constitutional symptoms, swelling, redness, pain, rash, itching, or increased skin temperature.

## 2025-03-04 ENCOUNTER — OFFICE VISIT (OUTPATIENT)
Age: 60
End: 2025-03-04
Payer: MEDICARE

## 2025-03-04 DIAGNOSIS — I89.0 LYMPHEDEMA OF ARM: Primary | ICD-10-CM

## 2025-03-04 PROCEDURE — 97140 MANUAL THERAPY 1/> REGIONS: CPT

## 2025-03-04 NOTE — PROGRESS NOTES
Daily Note     Today's date: 3/4/2025  Patient name: Kerrie Hoffman  : 1965  MRN: [id number]  Referring provider: Celi Chong,*  Dx:   Encounter Diagnosis     ICD-10-CM    1. Lymphedema of arm  I89.0                      Subjective: Patient reports arm is sore from radiation positioning.       Objective: See treatment diary below      Assessment: Kerrie Hoffman tolerated MLD to R UE crossing R transverse anastomosis to R inguinal nodes. Good hydration of skin with no scar. No signs or symptoms of skin infection noted. Continued treatment should benefit.      Plan: Continue per plan of care.        POC expires Unit limit Auth Expiration date PT/OT/ST + Visit Limit?   25 BOMN NA BOMN                             Visit/Unit Tracking  AUTH Status:  Date 2/23 2/27 3/4            NA Used 1 2 3             Remaining                    Precautions: CHF (controlled with meds)    Daily Treatment Diary:       Manuals 1/29 2/4 2/17 2/20 2/27 3/4 1/16 1/21 1/23 1/27   measurements             education        SJ     Self care             MLD SJ SJ SJ SJ SJ SJ SJ SJ SJ SJ   Garments         SJ    Neuro Re-Ed                                                                                                        Ther Ex                                       HEP review                                                                              Ther Activity                                       Gait Training                                       Modalities                                          PT reviewed with patient lymphedema education of skin care including: keeping extremity clean and dry, applying moisturizer daily, special attention to nail care, wearing sunscreen avoiding nicks and skin irritation from razors, wearing gloves with activities that may cause skin injury.   Also reviewed for patient to avoid excessive constriction such as tight clothing and jewelry, extreme temperature changes, and the  importance of compliance for bandaging and garments.   Patient also advised to contact physician if experiencing any constitutional symptoms, swelling, redness, pain, rash, itching, or increased skin temperature.

## 2025-03-06 ENCOUNTER — APPOINTMENT (OUTPATIENT)
Age: 60
End: 2025-03-06
Payer: MEDICARE

## 2025-03-11 ENCOUNTER — OFFICE VISIT (OUTPATIENT)
Age: 60
End: 2025-03-11
Payer: MEDICARE

## 2025-03-11 DIAGNOSIS — I89.0 LYMPHEDEMA OF ARM: Primary | ICD-10-CM

## 2025-03-11 PROCEDURE — 97140 MANUAL THERAPY 1/> REGIONS: CPT

## 2025-03-11 NOTE — PROGRESS NOTES
Daily Note     Today's date: 3/11/2025  Patient name: Kerrie Hoffman  : 1965  MRN: [id number]  Referring provider: Celi Chong,*  Dx:   Encounter Diagnosis     ICD-10-CM    1. Lymphedema of arm  I89.0                      Subjective: Patient reports she cannot wear her sleeve because of a rash on her elbow.      Objective: See treatment diary below      Assessment: Kerrie Hoffman tolerated MLD to R UE crossing R transverse anastomosis to R inguinal nodes. Good hydration of skin with no scar. No signs or symptoms of skin infection noted. Continued treatment should benefit.      Plan: Continue per plan of care.        POC expires Unit limit Auth Expiration date PT/OT/ST + Visit Limit?   25 BOMN NA BOMN                             Visit/Unit Tracking  AUTH Status:  Date 2/23 2/27 3/4 3/11           NA Used 1 2 3 4            Remaining                    Precautions: CHF (controlled with meds)    Daily Treatment Diary:       Manuals 1/29 2/4 2/17 2/20 2/27 3/4 3/11 1/21 1/23 1/27   measurements             education        SJ     Self care             MLD SJ SJ SJ SJ SJ SJ SJ SJ SJ SJ   Garments         SJ    Neuro Re-Ed                                                                                                        Ther Ex                                       HEP review                                                                              Ther Activity                                       Gait Training                                       Modalities                                          PT reviewed with patient lymphedema education of skin care including: keeping extremity clean and dry, applying moisturizer daily, special attention to nail care, wearing sunscreen avoiding nicks and skin irritation from razors, wearing gloves with activities that may cause skin injury.   Also reviewed for patient to avoid excessive constriction such as tight clothing and jewelry, extreme temperature  changes, and the importance of compliance for bandaging and garments.   Patient also advised to contact physician if experiencing any constitutional symptoms, swelling, redness, pain, rash, itching, or increased skin temperature.

## 2025-03-13 ENCOUNTER — OFFICE VISIT (OUTPATIENT)
Age: 60
End: 2025-03-13
Payer: MEDICARE

## 2025-03-13 DIAGNOSIS — I89.0 LYMPHEDEMA OF ARM: Primary | ICD-10-CM

## 2025-03-13 PROCEDURE — 97140 MANUAL THERAPY 1/> REGIONS: CPT

## 2025-03-13 NOTE — PROGRESS NOTES
Daily Note     Today's date: 3/13/2025  Patient name: Kerrie Hoffman  : 1965  MRN: [id number]  Referring provider: Celi Chong,*  Dx:   Encounter Diagnosis     ICD-10-CM    1. Lymphedema of arm  I89.0                      Subjective: Patient reports doctor cleared elbow as not a blood clot. Has prescribed antibiotic for rash.      Objective: See treatment diary below      Assessment: Kerrie Hoffman tolerated MLD to R UE crossing R transverse anastomosis to R inguinal nodes. Good hydration of skin with no scar. No signs or symptoms of skin infection noted. Continued treatment should benefit.      Plan: Continue per plan of care.        POC expires Unit limit Auth Expiration date PT/OT/ST + Visit Limit?   25 BOMN NA BOMN                             Visit/Unit Tracking  AUTH Status:  Date 2/23 2/27 3/4 3/11 3/13          NA Used 1 2 3 4 5           Remaining                    Precautions: CHF (controlled with meds)    Daily Treatment Diary:       Manuals 1/29 2/4 2/17 2/20 2/27 3/4 3/11 3/13 1/23 1/27   measurements             education             Self care             MLD SJ SJ SJ SJ SJ SJ SJ SJ SJ SJ   Garments         SJ    Neuro Re-Ed                                                                                                        Ther Ex                                       HEP review                                                                              Ther Activity                                       Gait Training                                       Modalities                                          PT reviewed with patient lymphedema education of skin care including: keeping extremity clean and dry, applying moisturizer daily, special attention to nail care, wearing sunscreen avoiding nicks and skin irritation from razors, wearing gloves with activities that may cause skin injury.   Also reviewed for patient to avoid excessive constriction such as tight clothing and jewelry,  extreme temperature changes, and the importance of compliance for bandaging and garments.   Patient also advised to contact physician if experiencing any constitutional symptoms, swelling, redness, pain, rash, itching, or increased skin temperature.

## 2025-03-18 ENCOUNTER — OFFICE VISIT (OUTPATIENT)
Age: 60
End: 2025-03-18
Payer: MEDICARE

## 2025-03-18 DIAGNOSIS — I89.0 LYMPHEDEMA OF ARM: Primary | ICD-10-CM

## 2025-03-18 PROCEDURE — 97140 MANUAL THERAPY 1/> REGIONS: CPT

## 2025-03-18 NOTE — PROGRESS NOTES
Daily Note     Today's date: 3/18/2025  Patient name: Kerrie Hoffman  : 1965  MRN: [id number]  Referring provider: Celi Chong,*  Dx:   Encounter Diagnosis     ICD-10-CM    1. Lymphedema of arm  I89.0                      Subjective: Patient reports she was given antibiotic for skin infection and now everything is better.       Objective: See treatment diary below      Assessment: Kerrie Hoffman tolerated MLD to R UE crossing R transverse anastomosis to R inguinal nodes. Good hydration of skin with no scar. No signs or symptoms of skin infection noted. Continued treatment should benefit.      Plan: Continue per plan of care.        POC expires Unit limit Auth Expiration date PT/OT/ST + Visit Limit?   25 BOMN NA BOMN                             Visit/Unit Tracking  AUTH Status:  Date 2/23 2/27 3/4 3/11 3/13 3/18         NA Used 1 2 3 4 5 6          Remaining                    Precautions: CHF (controlled with meds)    Daily Treatment Diary:       Manuals 1/29 2/4 2/17 2/20 2/27 3/4 3/11 3/13 3/18 1/27   measurements             education             Self care             MLD SJ SJ SJ SJ SJ SJ SJ SJ SJ SJ   Garments             Neuro Re-Ed                                                                                                        Ther Ex                                       HEP review                                                                              Ther Activity                                       Gait Training                                       Modalities                                          PT reviewed with patient lymphedema education of skin care including: keeping extremity clean and dry, applying moisturizer daily, special attention to nail care, wearing sunscreen avoiding nicks and skin irritation from razors, wearing gloves with activities that may cause skin injury.   Also reviewed for patient to avoid excessive constriction such as tight clothing and jewelry,  extreme temperature changes, and the importance of compliance for bandaging and garments.   Patient also advised to contact physician if experiencing any constitutional symptoms, swelling, redness, pain, rash, itching, or increased skin temperature.

## 2025-03-20 ENCOUNTER — OFFICE VISIT (OUTPATIENT)
Age: 60
End: 2025-03-20
Payer: MEDICARE

## 2025-03-20 DIAGNOSIS — I89.0 LYMPHEDEMA OF ARM: Primary | ICD-10-CM

## 2025-03-20 PROCEDURE — 97140 MANUAL THERAPY 1/> REGIONS: CPT

## 2025-03-20 NOTE — PROGRESS NOTES
Daily Note     Today's date: 3/20/2025  Patient name: Kerrie Hoffman  : 1965  MRN: [id number]  Referring provider: Celi Chong,*  Dx:   Encounter Diagnosis     ICD-10-CM    1. Lymphedema of arm  I89.0                      Subjective: Patient reports her arm is feeling better since antibiotic.       Objective: See treatment diary below      Assessment: Kerrie Hoffman tolerated MLD to R UE crossing R transverse anastomosis to R inguinal nodes. Good hydration of skin with no scar. No signs or symptoms of skin infection noted. Continued treatment should benefit.      Plan: Continue per plan of care.        POC expires Unit limit Auth Expiration date PT/OT/ST + Visit Limit?   25 BOMN NA BOMN                             Visit/Unit Tracking  AUTH Status:  Date 2/23 2/27 3/4 3/11 3/13 3/18 3/20        NA Used 1 2 3 4 5 6 7         Remaining                    Precautions: CHF (controlled with meds)    Daily Treatment Diary:       Manuals 1/29 2/4 2/17 2/20 2/27 3/4 3/11 3/13 3/18 3/20   measurements             education             Self care             MLD SJ SJ SJ SJ SJ SJ SJ SJ SJ SJ   Garments             Neuro Re-Ed                                                                                                        Ther Ex                                       HEP review                                                                              Ther Activity                                       Gait Training                                       Modalities                                          PT reviewed with patient lymphedema education of skin care including: keeping extremity clean and dry, applying moisturizer daily, special attention to nail care, wearing sunscreen avoiding nicks and skin irritation from razors, wearing gloves with activities that may cause skin injury.   Also reviewed for patient to avoid excessive constriction such as tight clothing and jewelry, extreme temperature  changes, and the importance of compliance for bandaging and garments.   Patient also advised to contact physician if experiencing any constitutional symptoms, swelling, redness, pain, rash, itching, or increased skin temperature.

## 2025-03-25 ENCOUNTER — EVALUATION (OUTPATIENT)
Age: 60
End: 2025-03-25
Payer: MEDICARE

## 2025-03-25 DIAGNOSIS — I89.0 LYMPHEDEMA OF ARM: Primary | ICD-10-CM

## 2025-03-25 PROCEDURE — 97140 MANUAL THERAPY 1/> REGIONS: CPT

## 2025-03-25 NOTE — PROGRESS NOTES
"Re-Evaluation    Today's date: 3/25/2025  Patient name: Kerrie Hoffman  : 1965  MRN: [id number]  Referring provider: Celi Chong,*  Dx:   Encounter Diagnosis     ICD-10-CM    1. Lymphedema of arm  I89.0                      Plan  Patient would benefit from: Continued full decongestive therapy to include MLD, compression therapy and therapeutic exercises.   Planned therapy interventions: Education, MLD, Compression, ROM, Therapeutic exercises and manual therapy.       STG  - Patient and/or caregiver will understand lymphedema precautions to decrease risk of infection and exacerbation of lymphedema Met  - Patient will develop a tolerance for wearing multi-layer, short stretch bandages betweens sessions to facilitate limb decongestion Met  - Patient will experience decrease in pitting edema which will improve tissue health and decrease risk of infection. Met  - Patient will perform HEP independently or with minimal assistance to help improve lymphatic flow and venous return Met    LTG  - Patient and/or caregiver will be independent with donning and doffing of compression garments which will enable regular daily garment wear at time of discharge, skin maintenance, and self- MLD   - Patient and/or caregiver will be independent with HEP and lymphedema management to prevent relapse and reduce risk of infection and hospitalizations at time of discharge    Frequency (visits/wk): 2x week   Duration (weeks):12 weeks  POC Start date: 3/25/2025  POC End date: 2025    Discussed with: Patient       Subjective    Kerrie Hoffman is a 59 y.o. year old female who presents to IE for discussion/consult of lymphedema. 7 months ago was dx with right side breast cancer. Had 12 lymphnodes removed on 2024. She has been cleared to initiate full CDT protocol prior to radiation.   She notes stiffness with shoulder movement and describes \"tension\" along pectoral and upper arm.     Does have hx of CHF that is controlled with " meds. Denies hx of DVT. Had acute kidney injury with medication a few years ago but this has passed and no other kidney issues noted.    She is accompanied by . She is not currently working. Was injured at work, with notable neck and back injuries    Kerrie presents to re-eval for measurements after to lymph node dissection and completion of chemotherapy.    Diagnosed in: June 2024 2/24/2025 Update:  Kerrie has felt a consistent improvement in swelling particularly in the hand. She does note that the heavier compression  causes her discomfort and she is happier wearing lower compression. She is starting radiation in one week and is nervous about swelling increasing because the doctor told her it could happen. She would like to continue treatment while on radiation.   3/25/2025 Update:  Kerrie continues with overall reduction in swelling of the R UE. Main concern at this point is preventative ROM and MLD for post radiation care. At this time she does not present with axillary web syndrome. He rehab was slowed due to a skin rash that prevented use of compression but has cleared up since doctor prescribed antibiotics. We will continue treatment to reach the maintenance phase.      Home setting: Single family dwelling  Support:   Goals: Resolve post op swelling      Objective - Fredis Fowler, PTA, CLT    UPPER EXTREMITY RIGHT CALCULATIONS      Flowsheet Row Most Recent Value   Volume UE (mL) 5084   Difference from last visit (mL)  199   Difference from first visit (mL)  -882   Difference from last visit (%)  4   Difference from first visit (%)  -21              From 2/24/25  UPPER EXTREMITY RIGHT CALCULATIONS      Flowsheet Row Most Recent Value   Volume UE (mL) 5283   Difference from last visit (mL)  183   Difference from first visit (mL)  -1081   Difference from last visit (%)  3   Difference from first visit (%)  -26          From 1/6/25  UPPER EXTREMITY RIGHT CALCULATIONS      Flowsheet Row Most Recent  Value   Volume UE (mL) 5466   Difference from last visit (mL)  -170   Difference from first visit (mL)  -170   Difference from last visit (%)  -3   Difference from first visit (%)  -3          Shoulder ROM shoulder abduction 160*. Full all other planes. Tenderness to palpation axillary region with increased redness from radiation. No axillary banding noted.     Physical assessment: R UE appears equal to L UE  Palpation:Forearm firmness   Skin Mobility: good  Skin color: redness in axilla   Pittin  Wound presence: no wounds present  Wound size: n/a  Wound color: n/a  Stemmer's Sign: -     Transfer status: I          POC expires Unit limit Auth Expiration date PT/OT/ST + Visit Limit?   25 BOMN NA BOMN                             Visit/Unit Tracking  AUTH Status:  Date 2/23 2/27 3/4 3/11 3/13 3/18 3/20 3/25       NA Used 1 2 3 4 5 6 7 8        Remaining                    Precautions: CHF (controlled with meds)    Daily Treatment Diary:       Manuals 3/25 2/4 2/17 2/20 2/27 3/4 3/11 3/13 3/18 3/20   measurements SJ            education             Self care             MLD SJ SJ SJ SJ SJ SJ SJ SJ SJ SJ   Garments             Neuro Re-Ed                                                                                                        Ther Ex                                       HEP review                                                                              Ther Activity                                       Gait Training                                       Modalities                                          PT reviewed with patient lymphedema education of skin care including: keeping extremity clean and dry, applying moisturizer daily, special attention to nail care, wearing sunscreen avoiding nicks and skin irritation from razors, wearing gloves with activities that may cause skin injury.   Also reviewed for patient to avoid excessive constriction such as tight clothing and jewelry, extreme  temperature changes, and the importance of compliance for bandaging and garments.   Patient also advised to contact physician if experiencing any constitutional symptoms, swelling, redness, pain, rash, itching, or increased skin temperature.

## 2025-03-27 ENCOUNTER — OFFICE VISIT (OUTPATIENT)
Age: 60
End: 2025-03-27
Payer: MEDICARE

## 2025-03-27 DIAGNOSIS — I89.0 LYMPHEDEMA OF ARM: Primary | ICD-10-CM

## 2025-03-27 PROCEDURE — 97140 MANUAL THERAPY 1/> REGIONS: CPT

## 2025-03-27 NOTE — PROGRESS NOTES
Daily Note     Today's date: 3/27/2025  Patient name: Kerrie Hoffman  : 1965  MRN: [id number]  Referring provider: Celi Chong,*  Dx:   Encounter Diagnosis     ICD-10-CM    1. Lymphedema of arm  I89.0                      Subjective: Patient reports started with a little blistering on radiation site and is using cream to keep the area from dying out.       Objective: See treatment diary below      Assessment: Kerrie Hoffman tolerated MLD to R UE crossing R transverse anastomosis to R inguinal nodes. Good hydration of skin with no scar. No signs or symptoms of skin infection noted. Continued treatment should benefit.      Plan: Continue per plan of care.        POC expires Unit limit Auth Expiration date PT/OT/ST + Visit Limit?   25 BOMN NA BOMN                             Visit/Unit Tracking  AUTH Status:  Date 2/23 2/27 3/4 3/11 3/13 3/18 3/20 3/25 3/27      NA Used 1 2 3 4 5 6 7 8 9       Remaining         RE           Precautions: CHF (controlled with meds)    Daily Treatment Diary:       Manuals 3/25 3/27 2/17 2/20 2/27 3/4 3/11 3/13 3/18 3/20   measurements SJ            education             Self care             MLD SJ SJ SJ SJ SJ SJ SJ SJ SJ SJ   Garments             Neuro Re-Ed                                                                                                        Ther Ex                                       HEP review                                                                              Ther Activity                                       Gait Training                                       Modalities                                          PT reviewed with patient lymphedema education of skin care including: keeping extremity clean and dry, applying moisturizer daily, special attention to nail care, wearing sunscreen avoiding nicks and skin irritation from razors, wearing gloves with activities that may cause skin injury.   Also reviewed for patient to avoid excessive  constriction such as tight clothing and jewelry, extreme temperature changes, and the importance of compliance for bandaging and garments.   Patient also advised to contact physician if experiencing any constitutional symptoms, swelling, redness, pain, rash, itching, or increased skin temperature.

## 2025-04-03 ENCOUNTER — OFFICE VISIT (OUTPATIENT)
Age: 60
End: 2025-04-03
Payer: MEDICARE

## 2025-04-03 DIAGNOSIS — I89.0 LYMPHEDEMA OF ARM: Primary | ICD-10-CM

## 2025-04-03 PROCEDURE — 97140 MANUAL THERAPY 1/> REGIONS: CPT

## 2025-04-03 NOTE — PROGRESS NOTES
Daily Note     Today's date: 4/3/2025  Patient name: Kerrie Hoffman  : 1965  MRN: [id number]  Referring provider: Celi Chong,*  Dx:   Encounter Diagnosis     ICD-10-CM    1. Lymphedema of arm  I89.0           Start Time: 831          Subjective: Patient reports she still gets a little swelling in hand but not as firm.      Objective: See treatment diary below      Assessment: Kerrie Hoffman tolerated MLD to R UE crossing R transverse anastomosis to R inguinal nodes. Good hydration of skin with no scar. No signs or symptoms of skin infection noted. Continued treatment should benefit.      Plan: Continue per plan of care.        POC expires Unit limit Auth Expiration date PT/OT/ST + Visit Limit?   25 BOMN NA BOMN                             Visit/Unit Tracking  AUTH Status:  Date 2/23 2/27 3/4 3/11 3/13 3/18 3/20 3/25 3/27 4/3     NA Used 1 2 3 4 5 6 7 8 9 10      Remaining         RE           Precautions: CHF (controlled with meds)    Daily Treatment Diary:       Manuals 3/25 3/27 4/3 2/20 2/27 3/4 3/11 3/13 3/18 3/20   measurements SJ            education             Self care             MLD SJ SJ SJ SJ SJ SJ SJ SJ SJ SJ   Garments             Neuro Re-Ed                                                                                                        Ther Ex                                       HEP review                                                                              Ther Activity                                       Gait Training                                       Modalities                                          PT reviewed with patient lymphedema education of skin care including: keeping extremity clean and dry, applying moisturizer daily, special attention to nail care, wearing sunscreen avoiding nicks and skin irritation from razors, wearing gloves with activities that may cause skin injury.   Also reviewed for patient to avoid excessive constriction such as tight  clothing and jewelry, extreme temperature changes, and the importance of compliance for bandaging and garments.   Patient also advised to contact physician if experiencing any constitutional symptoms, swelling, redness, pain, rash, itching, or increased skin temperature.

## 2025-04-15 ENCOUNTER — OFFICE VISIT (OUTPATIENT)
Age: 60
End: 2025-04-15
Attending: PHYSICIAN ASSISTANT
Payer: MEDICARE

## 2025-04-15 DIAGNOSIS — I89.0 LYMPHEDEMA OF ARM: Primary | ICD-10-CM

## 2025-04-15 PROCEDURE — 97140 MANUAL THERAPY 1/> REGIONS: CPT

## 2025-04-15 NOTE — PROGRESS NOTES
Daily Note     Today's date: 4/15/2025  Patient name: Kerrie Hoffman  : 1965  MRN: [id number]  Referring provider: Celi Chong,*  Dx:   Encounter Diagnosis     ICD-10-CM    1. Lymphedema of arm  I89.0           Start Time: 918          Subjective: Patient reports hand is staying less swollen for longer period.       Objective: See treatment diary below      Assessment: Kerrie Hoffman tolerated MLD to R UE crossing R transverse anastomosis to R inguinal nodes. Good hydration of skin with no scar. No signs or symptoms of skin infection noted. Continued treatment should benefit.    Plan: Continue per plan of care.        POC expires Unit limit Auth Expiration date PT/OT/ST + Visit Limit?   25 BOMN NA BOMN                             Visit/Unit Tracking  AUTH Status:  Date 2/23 2/27 3/4 3/11 3/13 3/18 3/20 3/25 3/27 4/3 4/15    NA Used 1 2 3 4 5 6 7 8 9 10 11     Remaining         RE           Precautions: CHF (controlled with meds)    Daily Treatment Diary:       Manuals 3/25 3/27 4/3 4/15 2/27 3/4 3/11 3/13 3/18 3/20   measurements SJ            education             Self care             MLD SJ SJ SJ SJ SJ SJ SJ SJ SJ SJ   Garments             Neuro Re-Ed                                                                                                        Ther Ex                                       HEP review                                                                              Ther Activity                                       Gait Training                                       Modalities                                          PT reviewed with patient lymphedema education of skin care including: keeping extremity clean and dry, applying moisturizer daily, special attention to nail care, wearing sunscreen avoiding nicks and skin irritation from razors, wearing gloves with activities that may cause skin injury.   Also reviewed for patient to avoid excessive constriction such as tight  clothing and jewelry, extreme temperature changes, and the importance of compliance for bandaging and garments.   Patient also advised to contact physician if experiencing any constitutional symptoms, swelling, redness, pain, rash, itching, or increased skin temperature.

## 2025-04-17 ENCOUNTER — OFFICE VISIT (OUTPATIENT)
Age: 60
End: 2025-04-17
Attending: PHYSICIAN ASSISTANT
Payer: MEDICARE

## 2025-04-17 DIAGNOSIS — I89.0 LYMPHEDEMA OF ARM: Primary | ICD-10-CM

## 2025-04-17 PROCEDURE — 97140 MANUAL THERAPY 1/> REGIONS: CPT

## 2025-04-17 NOTE — PROGRESS NOTES
Daily Note     Today's date: 2025  Patient name: Kerrie Hoffman  : 1965  MRN: [id number]  Referring provider: Celi Chong,*  Dx:   Encounter Diagnosis     ICD-10-CM    1. Lymphedema of arm  I89.0           Start Time: 919          Subjective: Patient reports she is doing really well. Swelling is staying the same and it is manageable at home.       Objective: See treatment diary below      Assessment:  Kerrie Hoffman tolerated MLD to R UE crossing R transverse anastomosis to R inguinal nodes. Good hydration of skin with no scar. No signs or symptoms of skin infection noted. Anticipate D/C to maintenance next 1 to 2 visits.       Plan: Continue per plan of care.        POC expires Unit limit Auth Expiration date PT/OT/ST + Visit Limit?   25 BOMN NA BOMN                             Visit/Unit Tracking  AUTH Status:  Date 2/23 2/27 3/4 3/11 3/13 3/18 3/20 3/25 3/27 4/3 4/15 4/17   NA Used 1 2 3 4 5 6 7 8 9 10 11 12    Remaining         RE           Precautions: CHF (controlled with meds)    Daily Treatment Diary:       Manuals 3/25 3/27 4/3 4/15 4/17 3/4 3/11 3/13 3/18 3/20   measurements SJ            education             Self care             MLD SJ SJ SJ SJ SJ SJ SJ SJ SJ SJ   Garments             Neuro Re-Ed                                                                                                        Ther Ex                                       HEP review                                                                              Ther Activity                                       Gait Training                                       Modalities                                          PT reviewed with patient lymphedema education of skin care including: keeping extremity clean and dry, applying moisturizer daily, special attention to nail care, wearing sunscreen avoiding nicks and skin irritation from razors, wearing gloves with activities that may cause skin injury.   Also reviewed  for patient to avoid excessive constriction such as tight clothing and jewelry, extreme temperature changes, and the importance of compliance for bandaging and garments.   Patient also advised to contact physician if experiencing any constitutional symptoms, swelling, redness, pain, rash, itching, or increased skin temperature.

## 2025-04-22 ENCOUNTER — OFFICE VISIT (OUTPATIENT)
Age: 60
End: 2025-04-22
Attending: PHYSICIAN ASSISTANT
Payer: MEDICARE

## 2025-04-22 DIAGNOSIS — I89.0 LYMPHEDEMA OF ARM: Primary | ICD-10-CM

## 2025-04-22 PROCEDURE — 97140 MANUAL THERAPY 1/> REGIONS: CPT

## 2025-04-22 NOTE — PROGRESS NOTES
Daily Note     Today's date: 2025  Patient name: Kerrie Hoffman  : 1965  MRN: [id number]  Referring provider: Celi Chong,*  Dx:   Encounter Diagnosis     ICD-10-CM    1. Lymphedema of arm  I89.0                      Subjective: Patient reports she is feeling good where she is. Would like me to review MLD with  so he can continue at home for her.       Objective: See treatment diary below      Assessment: Kerrie Hoffman tolerated MLD to R UE crossing R transverse anastomosis to R inguinal nodes. Good hydration of skin with no scar. No signs or symptoms of skin infection noted.Anticipate D/C next visit as we will review next visit with how home maintenance program is progressing.       Plan: Potential discharge next visit.       POC expires Unit limit Auth Expiration date PT/OT/ST + Visit Limit?   25 BOMN NA BOMN                             Visit/Unit Tracking  AUTH Status:  Date 4/22 2/27 3/4 3/11 3/13 3/18 3/20 3/25 3/27 4/3 4/15 4/17   NA Used 13 2 3 4 5 6 7 8 9 10 11 12    Remaining         RE           Precautions: CHF (controlled with meds)    Daily Treatment Diary:       Manuals 3/25 3/27 4/3 4/15 4/17 4/22 3/11 3/13 3/18 3/20   measurements SJ            education             Self care             MLD SJ SJ SJ SJ SJ SJ SJ SJ SJ SJ   Garments             Neuro Re-Ed                                                                                                        Ther Ex                                       HEP review                                                                              Ther Activity                                       Gait Training                                       Modalities                                          PT reviewed with patient lymphedema education of skin care including: keeping extremity clean and dry, applying moisturizer daily, special attention to nail care, wearing sunscreen avoiding nicks and skin irritation from razors, wearing  gloves with activities that may cause skin injury.   Also reviewed for patient to avoid excessive constriction such as tight clothing and jewelry, extreme temperature changes, and the importance of compliance for bandaging and garments.   Patient also advised to contact physician if experiencing any constitutional symptoms, swelling, redness, pain, rash, itching, or increased skin temperature.

## 2025-04-24 ENCOUNTER — APPOINTMENT (OUTPATIENT)
Age: 60
End: 2025-04-24
Payer: MEDICARE

## 2025-04-29 ENCOUNTER — APPOINTMENT (OUTPATIENT)
Age: 60
End: 2025-04-29
Payer: MEDICARE

## 2025-05-01 ENCOUNTER — APPOINTMENT (OUTPATIENT)
Age: 60
End: 2025-05-01
Payer: MEDICARE

## 2025-05-08 ENCOUNTER — APPOINTMENT (OUTPATIENT)
Age: 60
End: 2025-05-08
Attending: PHYSICIAN ASSISTANT
Payer: MEDICARE

## 2025-06-09 ENCOUNTER — EVALUATION (OUTPATIENT)
Age: 60
End: 2025-06-09
Attending: PHYSICIAN ASSISTANT
Payer: MEDICARE

## 2025-06-09 DIAGNOSIS — I89.0 LYMPHEDEMA OF RIGHT ARM: ICD-10-CM

## 2025-06-09 DIAGNOSIS — I89.0 LYMPHEDEMA OF ARM: Primary | ICD-10-CM

## 2025-06-09 PROCEDURE — 97140 MANUAL THERAPY 1/> REGIONS: CPT

## 2025-06-09 NOTE — PROGRESS NOTES
Re-Evaluation    Today's date: 3/25/2025  Patient name: Kerrie Hoffman  : 1965  MRN: [id number]  Referring provider: Celi Chong,*  Dx:   Encounter Diagnosis     ICD-10-CM    1. Lymphedema of arm  I89.0                      Plan  Patient would benefit from: Continued full decongestive therapy to include MLD, compression therapy and therapeutic exercises.   Planned therapy interventions: Education, MLD, Compression, ROM, Therapeutic exercises and manual therapy.       STG  - Patient and/or caregiver will understand lymphedema precautions to decrease risk of infection and exacerbation of lymphedema Met  - Patient will develop a tolerance for wearing multi-layer, short stretch bandages betweens sessions to facilitate limb decongestion Met  - Patient will experience decrease in pitting edema which will improve tissue health and decrease risk of infection. Met  - Patient will perform HEP independently or with minimal assistance to help improve lymphatic flow and venous return Met  - Patient will have decreased discomfort to palpation of fibrous tissue of right breast to 2/10.  LTG  - Patient will have resolution of discomfort to palpation of fibrous tissue of right breast.   - Patient and/or caregiver will be independent with donning and doffing of compression garments which will enable regular daily garment wear at time of discharge, skin maintenance, and self- MLD   - Patient and/or caregiver will be independent with HEP and lymphedema management to prevent relapse and reduce risk of infection and hospitalizations at time of discharge    Frequency (visits/wk): 1x week   Duration (weeks):12 weeks  POC Start date: 2025  POC End date: 2025    Discussed with: Patient       Subjective    Kerrie Hoffman is a 59 y.o. year old female who presents to IE for discussion/consult of lymphedema. 7 months ago was dx with right side breast cancer. Had 12 lymphnodes removed on 2024. She has been cleared to  "initiate full CDT protocol prior to radiation.   She notes stiffness with shoulder movement and describes \"tension\" along pectoral and upper arm.     Does have hx of CHF that is controlled with meds. Denies hx of DVT. Had acute kidney injury with medication a few years ago but this has passed and no other kidney issues noted.    She is accompanied by . She is not currently working. Was injured at work, with notable neck and back injuries    Kerrie presents to re-eval for measurements after to lymph node dissection and completion of chemotherapy.    Diagnosed in: June 2024 2/24/2025 Update:  Kerrie has felt a consistent improvement in swelling particularly in the hand. She does note that the heavier compression  causes her discomfort and she is happier wearing lower compression. She is starting radiation in one week and is nervous about swelling increasing because the doctor told her it could happen. She would like to continue treatment while on radiation.   3/25/2025 Update:  Kerrie continues with overall reduction in swelling of the R UE. Main concern at this point is preventative ROM and MLD for post radiation care. At this time she does not present with axillary web syndrome. He rehab was slowed due to a skin rash that prevented use of compression but has cleared up since doctor prescribed antibiotics. We will continue treatment to reach the maintenance phase.    6/9/2025 Update:  Kerrie reports she has had a good reduction in swelling and is staying pretty low. She arrives with a prescription for Lymphedema treatment to fibrous lumps on R breast. She would also like to be re measured to get new compression sleeve/gauntlet. Overall she is feeling really good and positive about where she is in her cancer walk.   Home setting: Single family dwelling  Support:   Goals: Resolve post op swelling      Objective - Fredis Fowler, PTA, CLT  UPPER EXTREMITY RIGHT CALCULATIONS      Flowsheet Row Most Recent Value "   Volume UE (mL) 4646   Difference from last visit (mL)  438   Difference from first visit (mL)  650   Difference from last visit (%)  9   Difference from first visit (%)  12              From 3/25/2025  UPPER EXTREMITY RIGHT CALCULATIONS      Flowsheet Row Most Recent Value   Volume UE (mL) 5084   Difference from last visit (mL)  199   Difference from first visit (mL)  -882   Difference from last visit (%)  4   Difference from first visit (%)  -21              From 25  UPPER EXTREMITY RIGHT CALCULATIONS      Flowsheet Row Most Recent Value   Volume UE (mL) 5283   Difference from last visit (mL)  183   Difference from first visit (mL)  -1081   Difference from last visit (%)  3   Difference from first visit (%)  -26          From 25  UPPER EXTREMITY RIGHT CALCULATIONS      Flowsheet Row Most Recent Value   Volume UE (mL) 5466   Difference from last visit (mL)  -170   Difference from first visit (mL)  -170   Difference from last visit (%)  -3   Difference from first visit (%)  -3          Shoulder ROM shoulder abduction 178*. Full all other planes. Tenderness to palpation axillary region with increased redness from radiation. No axillary banding noted. Tender breast above and below scar on R breast.      Physical assessment: R UE appears equal to L UE  Palpation:Forearm firmness resolved  Skin Mobility: good  Skin color: None  Pittin  Wound presence: no wounds present  Wound size: n/a  Wound color: n/a  Stemmer's Sign: -     Transfer status: I          POC expires Unit limit Auth Expiration date PT/OT/ST + Visit Limit?   25 BOMN NA BOMN                             Visit/Unit Tracking  AUTH Status:  Date 2/23 2/27 3/4 3/11 3/13 3/18 3/20 3/25 6/9      NA Used 1 2 3 4 5 6 7 8 9       Remaining                    Precautions: CHF (controlled with meds)    Daily Treatment Diary:       Manuals 3/25 6/9 2/17 2/20 2/27 3/4 3/11 3/13 3/18 3/20   measurements SJ SJ           education             Self care              STM   SJ           MLD SJ  SJ SJ SJ SJ SJ SJ SJ SJ   Garments  SJ           Neuro Re-Ed                                                                                                        Ther Ex                                       HEP review                                                                              Ther Activity                                       Gait Training                                       Modalities                                          PT reviewed with patient lymphedema education of skin care including: keeping extremity clean and dry, applying moisturizer daily, special attention to nail care, wearing sunscreen avoiding nicks and skin irritation from razors, wearing gloves with activities that may cause skin injury.   Also reviewed for patient to avoid excessive constriction such as tight clothing and jewelry, extreme temperature changes, and the importance of compliance for bandaging and garments.   Patient also advised to contact physician if experiencing any constitutional symptoms, swelling, redness, pain, rash, itching, or increased skin temperature.

## 2025-06-19 ENCOUNTER — OFFICE VISIT (OUTPATIENT)
Age: 60
End: 2025-06-19
Attending: PHYSICIAN ASSISTANT
Payer: MEDICARE

## 2025-06-19 DIAGNOSIS — I89.0 LYMPHEDEMA OF RIGHT ARM: ICD-10-CM

## 2025-06-19 DIAGNOSIS — I89.0 LYMPHEDEMA OF ARM: Primary | ICD-10-CM

## 2025-06-19 PROCEDURE — 97140 MANUAL THERAPY 1/> REGIONS: CPT

## 2025-06-19 NOTE — PROGRESS NOTES
Daily Note     Today's date: 2025  Patient name: Kerrie Hoffman  : 1965  MRN: [id number]  Referring provider: Celi Chong,*  Dx:   Encounter Diagnosis     ICD-10-CM    1. Lymphedema of arm  I89.0       2. Lymphedema of right arm  I89.0                      Subjective: Patient reports she had 2 days of relief after last visit.       Objective: See treatment diary below      Assessment: Kerrie Hoffman tolerated treatment well. Tenderness to palpation decreased with manual. Performed, issued and reviewed HEP. Continued treatment indicated.       Plan: Continue per plan of care.        POC expires Unit limit Auth Expiration date PT/OT/ST + Visit Limit?   25 BOMN NA BOMN                             Visit/Unit Tracking  AUTH Status:  Date              NA Used 1 2              Remaining                    Precautions: CHF (controlled with meds)    Daily Treatment Diary:       Manuals 3/25 6/9 6/19 2/20 2/27 3/4 3/11 3/13 3/18 3/20   measurements SJ SJ           education             Self care             STM   SJ SJ          MLD SJ   SJ SJ SJ SJ SJ SJ SJ   Garments  SJ           Neuro Re-Ed                                                                                                        Ther Ex                                       HEP perform/ review   SJ                                                                           Ther Activity                                       Gait Training                                       Modalities                                            PT reviewed with patient lymphedema education of skin care including: keeping extremity clean and dry, applying moisturizer daily, special attention to nail care, wearing sunscreen avoiding nicks and skin irritation from razors, wearing gloves with activities that may cause skin injury.   Also reviewed for patient to avoid excessive constriction such as tight clothing and jewelry, extreme temperature changes,  and the importance of compliance for bandaging and garments.   Patient also advised to contact physician if experiencing any constitutional symptoms, swelling, redness, pain, rash, itching, or increased skin temperature.      Access Code: [email or web address]  URL: [email or web address]  Date: 06/19/2025  Prepared by: Fredis Fowler    Exercises  - Sidelying Open Book Thoracic Rotation with Knee on Foam Roll  - 1 x daily - 7 x weekly - 1 sets - 10 reps - 10 seconds hold  - Sidelying Open Book  - 1 x daily - 7 x weekly - 1 sets - 10 reps - 10 seconds hold  - Doorway Pec Stretch at 60 Elevation  - 1 x daily - 7 x weekly - 1 sets - 10 reps - 10 seconds hold

## 2025-06-25 ENCOUNTER — OFFICE VISIT (OUTPATIENT)
Age: 60
End: 2025-06-25
Attending: PHYSICIAN ASSISTANT
Payer: MEDICARE

## 2025-06-25 DIAGNOSIS — I89.0 LYMPHEDEMA OF RIGHT ARM: ICD-10-CM

## 2025-06-25 DIAGNOSIS — I89.0 LYMPHEDEMA OF ARM: Primary | ICD-10-CM

## 2025-06-25 PROCEDURE — 97140 MANUAL THERAPY 1/> REGIONS: CPT

## 2025-06-25 NOTE — PROGRESS NOTES
Daily Note     Today's date: 2025  Patient name: Kerrie Hoffman  : 1965  MRN: [id number]  Referring provider: Celi Chong,*  Dx:   Encounter Diagnosis     ICD-10-CM    1. Lymphedema of arm  I89.0       2. Lymphedema of right arm  I89.0                      Subjective: Patient reports overall decrease in discomfort noted.       Objective: See treatment diary below      Assessment: Kerrie Hoffman tolerated treatment well. Decreased firmness noted with tender points. Continued treatment indicated.       Plan: Continue per plan of care.        POC expires Unit limit Auth Expiration date PT/OT/ST + Visit Limit?   25 BOMN NA BOMN                             Visit/Unit Tracking  AUTH Status:  Date             NA Used 1 2 3             Remaining                    Precautions: CHF (controlled with meds)    Daily Treatment Diary:       Manuals 3/25 6/9 6/19 6/25 2/27 3/4 3/11 3/13 3/18 3/20   measurements SJ SJ           education             Self care             STM   SJ SJ SJ         MLD SJ    SJ SJ SJ SJ SJ SJ   Garments  SJ           Neuro Re-Ed                                                                                                        Ther Ex                                       HEP perform/ review   SJ performed                                                                          Ther Activity                                       Gait Training                                       Modalities                                            PT reviewed with patient lymphedema education of skin care including: keeping extremity clean and dry, applying moisturizer daily, special attention to nail care, wearing sunscreen avoiding nicks and skin irritation from razors, wearing gloves with activities that may cause skin injury.   Also reviewed for patient to avoid excessive constriction such as tight clothing and jewelry, extreme temperature changes, and the importance of  compliance for bandaging and garments.   Patient also advised to contact physician if experiencing any constitutional symptoms, swelling, redness, pain, rash, itching, or increased skin temperature.      Access Code: [email or web address]  URL: [email or web address]  Date: 06/19/2025  Prepared by: Fredis Fowler    Exercises  - Sidelying Open Book Thoracic Rotation with Knee on Foam Roll  - 1 x daily - 7 x weekly - 1 sets - 10 reps - 10 seconds hold  - Sidelying Open Book  - 1 x daily - 7 x weekly - 1 sets - 10 reps - 10 seconds hold  - Doorway Pec Stretch at 60 Elevation  - 1 x daily - 7 x weekly - 1 sets - 10 reps - 10 seconds hold

## 2025-06-26 ENCOUNTER — APPOINTMENT (OUTPATIENT)
Age: 60
End: 2025-06-26
Attending: PHYSICIAN ASSISTANT
Payer: MEDICARE

## 2025-07-03 ENCOUNTER — OFFICE VISIT (OUTPATIENT)
Age: 60
End: 2025-07-03
Attending: PHYSICIAN ASSISTANT
Payer: MEDICARE

## 2025-07-03 DIAGNOSIS — I89.0 LYMPHEDEMA OF ARM: Primary | ICD-10-CM

## 2025-07-03 DIAGNOSIS — I89.0 LYMPHEDEMA OF RIGHT ARM: ICD-10-CM

## 2025-07-03 PROCEDURE — 97140 MANUAL THERAPY 1/> REGIONS: CPT

## 2025-07-03 NOTE — PROGRESS NOTES
Daily Note     Today's date: 7/3/2025  Patient name: Kerrie Hoffman  : 1965  MRN: [id number]  Referring provider: Celi Chong,*  Dx:   Encounter Diagnosis     ICD-10-CM    1. Lymphedema of arm  I89.0       2. Lymphedema of right arm  I89.0                      Subjective: Patient reports overall discomfort has decreased just a few tender spots at this point.       Objective: See treatment diary below      Assessment: Kerrie Hoffman tolerated treatment well. Symptoms decreasing with manual and stretching. Continued treatment indicated.       Plan: Continue per plan of care.        POC expires Unit limit Auth Expiration date PT/OT/ST + Visit Limit?   25 BOMN NA BOMN                             Visit/Unit Tracking  AUTH Status:  Date 6/9 6/19 6/25 7/3           NA Used 1 2 3 4            Remaining                    Precautions: CHF (controlled with meds)    Daily Treatment Diary:       Manuals 3/25 6/9 6/19 6/25 7/3 3/4 3/11 3/13 3/18 3/20   measurements SJ SJ           education             Self care             STM   SJ SJ SJ         MLD SJ    SJ SJ SJ SJ SJ SJ   Garments  SJ           Neuro Re-Ed                                                                                                        Ther Ex                                       HEP perform/ review   SJ performed performed                                                                         Ther Activity                                       Gait Training                                       Modalities                                            PT reviewed with patient lymphedema education of skin care including: keeping extremity clean and dry, applying moisturizer daily, special attention to nail care, wearing sunscreen avoiding nicks and skin irritation from razors, wearing gloves with activities that may cause skin injury.   Also reviewed for patient to avoid excessive constriction such as tight clothing and jewelry, extreme  temperature changes, and the importance of compliance for bandaging and garments.   Patient also advised to contact physician if experiencing any constitutional symptoms, swelling, redness, pain, rash, itching, or increased skin temperature.      Access Code: [email or web address]  URL: [email or web address]  Date: 06/19/2025  Prepared by: Fredis Fowler    Exercises  - Sidelying Open Book Thoracic Rotation with Knee on Foam Roll  - 1 x daily - 7 x weekly - 1 sets - 10 reps - 10 seconds hold  - Sidelying Open Book  - 1 x daily - 7 x weekly - 1 sets - 10 reps - 10 seconds hold  - Doorway Pec Stretch at 60 Elevation  - 1 x daily - 7 x weekly - 1 sets - 10 reps - 10 seconds hold

## 2025-07-10 ENCOUNTER — APPOINTMENT (OUTPATIENT)
Age: 60
End: 2025-07-10
Attending: PHYSICIAN ASSISTANT
Payer: MEDICARE

## 2025-07-17 ENCOUNTER — EVALUATION (OUTPATIENT)
Age: 60
End: 2025-07-17
Attending: PHYSICIAN ASSISTANT
Payer: MEDICARE

## 2025-07-17 DIAGNOSIS — I89.0 LYMPHEDEMA OF ARM: Primary | ICD-10-CM

## 2025-07-17 DIAGNOSIS — I89.0 LYMPHEDEMA OF RIGHT ARM: ICD-10-CM

## 2025-07-17 PROCEDURE — 97140 MANUAL THERAPY 1/> REGIONS: CPT

## 2025-07-17 NOTE — PROGRESS NOTES
Re-Evaluation    Today's date: 2025  Patient name: Kerrie Hoffman  : 1965  MRN: [id number]  Referring provider: Celi Chong,*  Dx:   Encounter Diagnosis     ICD-10-CM    1. Lymphedema of arm  I89.0                      Plan  Patient would benefit from: Continued full decongestive therapy to include MLD, compression therapy and therapeutic exercises.   Planned therapy interventions: Education, MLD, Compression, ROM, Therapeutic exercises and manual therapy.       STG  - Patient and/or caregiver will understand lymphedema precautions to decrease risk of infection and exacerbation of lymphedema Met  - Patient will develop a tolerance for wearing multi-layer, short stretch bandages betweens sessions to facilitate limb decongestion Met  - Patient will experience decrease in pitting edema which will improve tissue health and decrease risk of infection. Met  - Patient will perform HEP independently or with minimal assistance to help improve lymphatic flow and venous return Met  - Patient will have decreased discomfort to palpation of fibrous tissue of right breast to 2/10. Met  LTG  - Patient will have resolution of discomfort to palpation of fibrous tissue of right breast.   - Patient and/or caregiver will be independent with donning and doffing of compression garments which will enable regular daily garment wear at time of discharge, skin maintenance, and self- MLD   - Patient and/or caregiver will be independent with HEP and lymphedema management to prevent relapse and reduce risk of infection and hospitalizations at time of discharge    Frequency (visits/wk): 1x week   Duration (weeks):12 weeks  POC Start date: 2025  POC End date: 2025    Discussed with: Patient       Subjective    Kerrie Hoffman is a 59 y.o. year old female who presents to IE for discussion/consult of lymphedema. 7 months ago was dx with right side breast cancer. Had 12 lymphnodes removed on 2024. She has been cleared to  "initiate full CDT protocol prior to radiation.   She notes stiffness with shoulder movement and describes \"tension\" along pectoral and upper arm.     Does have hx of CHF that is controlled with meds. Denies hx of DVT. Had acute kidney injury with medication a few years ago but this has passed and no other kidney issues noted.    She is accompanied by . She is not currently working. Was injured at work, with notable neck and back injuries    Kerrie presents to re-eval for measurements after to lymph node dissection and completion of chemotherapy.    Diagnosed in: June 2024 2/24/2025 Update:  Kerrie has felt a consistent improvement in swelling particularly in the hand. She does note that the heavier compression  causes her discomfort and she is happier wearing lower compression. She is starting radiation in one week and is nervous about swelling increasing because the doctor told her it could happen. She would like to continue treatment while on radiation.   3/25/2025 Update:  Kerrie continues with overall reduction in swelling of the R UE. Main concern at this point is preventative ROM and MLD for post radiation care. At this time she does not present with axillary web syndrome. He rehab was slowed due to a skin rash that prevented use of compression but has cleared up since doctor prescribed antibiotics. We will continue treatment to reach the maintenance phase.    6/9/2025 Update:  Kerrie reports she has had a good reduction in swelling and is staying pretty low. She arrives with a prescription for Lymphedema treatment to fibrous lumps on R breast. She would also like to be re measured to get new compression sleeve/gauntlet. Overall she is feeling really good and positive about where she is in her cancer walk.   7/17/2025 Update:   Kerrie reports only fibrous lump noted in L breast. Resolution of R lumps with current treatment. Reports sleeves have been ordered and should arrive next week. Reports she feels she is " progressing well and soreness has really gone down.   Home setting: Single family dwelling  Support:   Goals: Resolve post op swelling      Objective - Fredis Fowler, PTA, CLT  UPPER EXTREMITY RIGHT CALCULATIONS      Flowsheet Row Most Recent Value   Volume UE (mL) 4597   Difference from last visit (mL)  49   Difference from first visit (mL)  -395   Difference from last visit (%)  1   Difference from first visit (%)  -9              2025  UPPER EXTREMITY RIGHT CALCULATIONS      Flowsheet Row Most Recent Value   Volume UE (mL) 4646   Difference from last visit (mL)  438   Difference from first visit (mL)  650   Difference from last visit (%)  9   Difference from first visit (%)  12              From 3/25/2025  UPPER EXTREMITY RIGHT CALCULATIONS      Flowsheet Row Most Recent Value   Volume UE (mL) 5084   Difference from last visit (mL)  199   Difference from first visit (mL)  -882   Difference from last visit (%)  4   Difference from first visit (%)  -21              From 25  UPPER EXTREMITY RIGHT CALCULATIONS      Flowsheet Row Most Recent Value   Volume UE (mL) 5283   Difference from last visit (mL)  183   Difference from first visit (mL)  -1081   Difference from last visit (%)  3   Difference from first visit (%)  -26          From 25  UPPER EXTREMITY RIGHT CALCULATIONS      Flowsheet Row Most Recent Value   Volume UE (mL) 5466   Difference from last visit (mL)  -170   Difference from first visit (mL)  -170   Difference from last visit (%)  -3   Difference from first visit (%)  -3          Shoulder ROM shoulder abduction 178*. Full all other planes. Tenderness to palpation axillary region with increased redness from radiation. No axillary banding noted. Tender breast above and below scar on R breast.      Physical assessment: R UE appears equal to L UE  Palpation:Forearm firmness resolved  Skin Mobility: good  Skin color: None  Pittin  Wound presence: no wounds present  Wound size:  n/a  Wound color: n/a  Stemmer's Sign: -     Transfer status: I     Precautions: CHF (controlled with meds)    Daily Treatment Diary:       Manuals 3/25 6/9 6/19 6/25 7/3 7/17 3/11 3/13 3/18 3/20   measurements SJ SJ    SJ       education             Self care             STM   SJ SJ SJ         MLD SJ    SJ SJ SJ SJ SJ SJ   Garments  SJ           Neuro Re-Ed                                                                                                        Ther Ex                                       HEP perform/ review   SJ performed performed                                                                         Ther Activity                                       Gait Training                                       Modalities                                            PT reviewed with patient lymphedema education of skin care including: keeping extremity clean and dry, applying moisturizer daily, special attention to nail care, wearing sunscreen avoiding nicks and skin irritation from razors, wearing gloves with activities that may cause skin injury.   Also reviewed for patient to avoid excessive constriction such as tight clothing and jewelry, extreme temperature changes, and the importance of compliance for bandaging and garments.   Patient also advised to contact physician if experiencing any constitutional symptoms, swelling, redness, pain, rash, itching, or increased skin temperature.      Access Code: [email or web address]  URL: [email or web address]  Date: 06/19/2025  Prepared by: Fredis Fowler    Exercises  - Sidelying Open Book Thoracic Rotation with Knee on Foam Roll  - 1 x daily - 7 x weekly - 1 sets - 10 reps - 10 seconds hold  - Sidelying Open Book  - 1 x daily - 7 x weekly - 1 sets - 10 reps - 10 seconds hold  - Doorway Pec Stretch at 60 Elevation  - 1 x daily - 7 x weekly - 1 sets - 10 reps - 10 seconds hold

## 2025-07-24 ENCOUNTER — OFFICE VISIT (OUTPATIENT)
Age: 60
End: 2025-07-24
Attending: PHYSICIAN ASSISTANT
Payer: MEDICARE

## 2025-07-24 DIAGNOSIS — I89.0 LYMPHEDEMA OF ARM: Primary | ICD-10-CM

## 2025-07-24 DIAGNOSIS — I89.0 LYMPHEDEMA OF RIGHT ARM: ICD-10-CM

## 2025-07-24 PROCEDURE — 97140 MANUAL THERAPY 1/> REGIONS: CPT

## 2025-07-24 NOTE — PROGRESS NOTES
Daily Note     Today's date: 2025  Patient name: Kerrie Hoffman  : 1965  MRN: [id number]  Referring provider: Celi Chong,*  Dx:   Encounter Diagnosis     ICD-10-CM    1. Lymphedema of arm  I89.0       2. Lymphedema of right arm  I89.0                      Subjective: Patient reports she has noted no fibrous lumps since last visit.       Objective: See treatment diary below      Assessment: Kerrie Hoffman tolerated treatment well. Minor lumps noted with no discomfort. We will be going to every other week and if no lump return will plan on D/C to maintenance program with follow up as necessary.      Plan: Continue per plan of care.  Potential discharge next visit.       POC expires Unit limit Auth Expiration date PT/OT/ST + Visit Limit?   25 BOMN NA BOMN                             Visit/Unit Tracking  AUTH Status:  Date 6/9 6/19 6/25 7/3 7/17 7/24         NA Used 1 2 3 4 5 6          Remaining                    Precautions: CHF (controlled with meds)    Daily Treatment Diary:       Manuals 3/25 6/9 6/19 6/25 7/3 7/17 7/24 3/13 3/18 3/20   measurements SJ SJ    SJ       education             Self care             STM   SJ SJ SJ  SJ SJ      MLD SJ    SJ   SJ SJ SJ   Garments  SJ           Neuro Re-Ed                                                                                                        Ther Ex                                       HEP perform/ review   SJ performed performed                                                                         Ther Activity                                       Gait Training                                       Modalities                                              PT reviewed with patient lymphedema education of skin care including: keeping extremity clean and dry, applying moisturizer daily, special attention to nail care, wearing sunscreen avoiding nicks and skin irritation from razors, wearing gloves with activities that may cause skin  injury.   Also reviewed for patient to avoid excessive constriction such as tight clothing and jewelry, extreme temperature changes, and the importance of compliance for bandaging and garments.   Patient also advised to contact physician if experiencing any constitutional symptoms, swelling, redness, pain, rash, itching, or increased skin temperature.      Access Code: [email or web address]  URL: [email or web address]  Date: 06/19/2025  Prepared by: Fredis Fowler    Exercises  - Sidelying Open Book Thoracic Rotation with Knee on Foam Roll  - 1 x daily - 7 x weekly - 1 sets - 10 reps - 10 seconds hold  - Sidelying Open Book  - 1 x daily - 7 x weekly - 1 sets - 10 reps - 10 seconds hold  - Doorway Pec Stretch at 60 Elevation  - 1 x daily - 7 x weekly - 1 sets - 10 reps - 10 seconds hold

## 2025-07-29 ENCOUNTER — APPOINTMENT (OUTPATIENT)
Age: 60
End: 2025-07-29
Attending: PHYSICIAN ASSISTANT
Payer: MEDICARE

## 2025-07-31 ENCOUNTER — APPOINTMENT (OUTPATIENT)
Age: 60
End: 2025-07-31
Attending: PHYSICIAN ASSISTANT
Payer: MEDICARE

## 2025-08-07 ENCOUNTER — OFFICE VISIT (OUTPATIENT)
Age: 60
End: 2025-08-07
Attending: PHYSICIAN ASSISTANT
Payer: MEDICARE

## 2025-08-07 DIAGNOSIS — I89.0 LYMPHEDEMA OF ARM: Primary | ICD-10-CM

## 2025-08-07 DIAGNOSIS — I89.0 LYMPHEDEMA OF RIGHT ARM: ICD-10-CM

## 2025-08-07 PROCEDURE — 97140 MANUAL THERAPY 1/> REGIONS: CPT
